# Patient Record
Sex: FEMALE | Race: WHITE | Employment: UNEMPLOYED | URBAN - METROPOLITAN AREA
[De-identification: names, ages, dates, MRNs, and addresses within clinical notes are randomized per-mention and may not be internally consistent; named-entity substitution may affect disease eponyms.]

---

## 2019-02-23 ENCOUNTER — APPOINTMENT (OUTPATIENT)
Dept: GENERAL RADIOLOGY | Age: 79
End: 2019-02-23
Payer: MEDICARE

## 2019-02-23 ENCOUNTER — HOSPITAL ENCOUNTER (EMERGENCY)
Age: 79
Discharge: HOME | End: 2019-02-23
Attending: EMERGENCY MEDICINE
Payer: MEDICARE

## 2019-02-23 VITALS
WEIGHT: 130 LBS | HEIGHT: 61 IN | HEART RATE: 79 BPM | BODY MASS INDEX: 24.55 KG/M2 | OXYGEN SATURATION: 96 % | TEMPERATURE: 98.1 F | RESPIRATION RATE: 18 BRPM | DIASTOLIC BLOOD PRESSURE: 67 MMHG | SYSTOLIC BLOOD PRESSURE: 131 MMHG

## 2019-02-23 DIAGNOSIS — S00.83XA CONTUSION OF FACE, INITIAL ENCOUNTER: Primary | ICD-10-CM

## 2019-02-23 DIAGNOSIS — S40.012A CONTUSION OF LEFT SHOULDER, INITIAL ENCOUNTER: ICD-10-CM

## 2019-02-23 PROCEDURE — 99283 EMERGENCY DEPT VISIT LOW MDM: CPT

## 2019-02-23 PROCEDURE — 73030 X-RAY EXAM OF SHOULDER: CPT

## 2019-02-23 RX ORDER — LISINOPRIL 40 MG/1
40 TABLET ORAL DAILY
COMMUNITY

## 2019-02-23 RX ORDER — INSULIN GLARGINE 100 [IU]/ML
50 INJECTION, SOLUTION SUBCUTANEOUS NIGHTLY
COMMUNITY

## 2019-02-23 RX ORDER — BUPRENORPHINE 15 UG/H
1 PATCH TRANSDERMAL WEEKLY
COMMUNITY

## 2019-02-23 RX ORDER — GABAPENTIN 300 MG/1
300 CAPSULE ORAL 2 TIMES DAILY
COMMUNITY

## 2019-02-23 RX ORDER — TRAMADOL HYDROCHLORIDE 50 MG/1
50 TABLET ORAL EVERY 6 HOURS PRN
COMMUNITY

## 2019-02-23 ASSESSMENT — PAIN DESCRIPTION - FREQUENCY: FREQUENCY: CONTINUOUS

## 2019-02-23 ASSESSMENT — ENCOUNTER SYMPTOMS
ABDOMINAL PAIN: 0
COLOR CHANGE: 0
EYE DISCHARGE: 0
COUGH: 0
FACIAL SWELLING: 0
DIARRHEA: 0
SHORTNESS OF BREATH: 0
CONSTIPATION: 0
VOMITING: 0
EYE REDNESS: 0

## 2019-02-23 ASSESSMENT — PAIN DESCRIPTION - DESCRIPTORS: DESCRIPTORS: ACHING;CONSTANT;SHARP

## 2019-02-23 ASSESSMENT — PAIN DESCRIPTION - ORIENTATION: ORIENTATION: LEFT

## 2019-02-23 ASSESSMENT — PAIN DESCRIPTION - LOCATION: LOCATION: SHOULDER;HEAD

## 2019-02-23 ASSESSMENT — PAIN SCALES - GENERAL: PAINLEVEL_OUTOF10: 7

## 2023-05-03 ENCOUNTER — HOSPITAL ENCOUNTER (OUTPATIENT)
Facility: CLINIC | Age: 83
Setting detail: SPECIMEN
Discharge: HOME | End: 2023-05-03
Payer: MEDICARE

## 2023-05-03 LAB
ABSOLUTE EOS #: 0.13 K/UL (ref 0–0.44)
ABSOLUTE IMMATURE GRANULOCYTE: <0.03 K/UL (ref 0–0.3)
ABSOLUTE LYMPH #: 0.95 K/UL (ref 1.1–3.7)
ABSOLUTE MONO #: 0.44 K/UL (ref 0.1–1.2)
BASOPHILS # BLD: 1 % (ref 0–2)
BASOPHILS ABSOLUTE: 0.05 K/UL (ref 0–0.2)
EOSINOPHILS RELATIVE PERCENT: 2 % (ref 1–4)
HCT VFR BLD AUTO: 36.7 % (ref 36.3–47.1)
HGB BLD-MCNC: 11.1 G/DL (ref 11.9–15.1)
IMMATURE GRANULOCYTES: 0 %
LYMPHOCYTES # BLD: 15 % (ref 24–43)
MCH RBC QN AUTO: 24.6 PG (ref 25.2–33.5)
MCHC RBC AUTO-ENTMCNC: 30.2 G/DL (ref 28.4–34.8)
MCV RBC AUTO: 81.4 FL (ref 82.6–102.9)
MONOCYTES # BLD: 7 % (ref 3–12)
NRBC AUTOMATED: 0 PER 100 WBC
PDW BLD-RTO: 19.1 % (ref 11.8–14.4)
PLATELET # BLD AUTO: 244 K/UL (ref 138–453)
PMV BLD AUTO: 11.2 FL (ref 8.1–13.5)
RBC # BLD: 4.51 M/UL (ref 3.95–5.11)
RBC # BLD: ABNORMAL 10*6/UL
SEG NEUTROPHILS: 75 % (ref 36–65)
SEGMENTED NEUTROPHILS ABSOLUTE COUNT: 4.6 K/UL (ref 1.5–8.1)
WBC # BLD AUTO: 6.2 K/UL (ref 3.5–11.3)

## 2023-05-03 PROCEDURE — 84443 ASSAY THYROID STIM HORMONE: CPT

## 2023-05-03 PROCEDURE — 84480 ASSAY TRIIODOTHYRONINE (T3): CPT

## 2023-05-03 PROCEDURE — 85025 COMPLETE CBC W/AUTO DIFF WBC: CPT

## 2023-05-03 PROCEDURE — 84439 ASSAY OF FREE THYROXINE: CPT

## 2023-05-04 LAB
T3 SERPL-MCNC: 106 NG/DL (ref 60–181)
T4 FREE SERPL-MCNC: 1.1 NG/DL (ref 0.9–1.7)
TSH SERPL-ACNC: 1.02 UIU/ML (ref 0.3–5)

## 2024-06-04 ENCOUNTER — HOSPITAL ENCOUNTER (INPATIENT)
Age: 84
LOS: 3 days | Discharge: NURSING, REHAB OR LONG-TERM CARE | DRG: 563 | End: 2024-06-07
Attending: SPECIALIST | Admitting: FAMILY MEDICINE
Payer: MEDICARE

## 2024-06-04 ENCOUNTER — APPOINTMENT (OUTPATIENT)
Dept: CT IMAGING | Facility: CLINIC | Age: 84
DRG: 563 | End: 2024-06-04
Payer: MEDICARE

## 2024-06-04 ENCOUNTER — APPOINTMENT (OUTPATIENT)
Dept: GENERAL RADIOLOGY | Facility: CLINIC | Age: 84
DRG: 563 | End: 2024-06-04
Payer: MEDICARE

## 2024-06-04 DIAGNOSIS — R74.8 ELEVATED CREATINE KINASE LEVEL: ICD-10-CM

## 2024-06-04 DIAGNOSIS — D50.0 IRON DEFICIENCY ANEMIA DUE TO CHRONIC BLOOD LOSS: ICD-10-CM

## 2024-06-04 DIAGNOSIS — S82.142A CLOSED FRACTURE OF LEFT TIBIAL PLATEAU, INITIAL ENCOUNTER: Primary | ICD-10-CM

## 2024-06-04 DIAGNOSIS — T14.8XXA FRACTURE: ICD-10-CM

## 2024-06-04 DIAGNOSIS — D64.9 ANEMIA, UNSPECIFIED TYPE: ICD-10-CM

## 2024-06-04 PROBLEM — N18.9 CHRONIC KIDNEY DISEASE: Status: ACTIVE | Noted: 2022-11-20

## 2024-06-04 PROBLEM — I77.89 ACQUIRED ARTERIOVENOUS MALFORMATION (HCC): Status: ACTIVE | Noted: 2021-04-22

## 2024-06-04 PROBLEM — I42.8 NONISCHEMIC CARDIOMYOPATHY (HCC): Status: ACTIVE | Noted: 2021-01-19

## 2024-06-04 PROBLEM — G47.33 OBSTRUCTIVE SLEEP APNEA SYNDROME: Status: ACTIVE | Noted: 2020-05-12

## 2024-06-04 PROBLEM — F09 MILD COGNITIVE DISORDER: Status: ACTIVE | Noted: 2020-07-07

## 2024-06-04 PROBLEM — Z79.4 TYPE 2 DIABETES MELLITUS WITHOUT COMPLICATION, WITH LONG-TERM CURRENT USE OF INSULIN (HCC): Status: ACTIVE | Noted: 2021-01-19

## 2024-06-04 PROBLEM — I10 ESSENTIAL HYPERTENSION: Status: ACTIVE | Noted: 2021-01-19

## 2024-06-04 PROBLEM — E11.9 TYPE 2 DIABETES MELLITUS WITHOUT COMPLICATION, WITH LONG-TERM CURRENT USE OF INSULIN (HCC): Status: ACTIVE | Noted: 2021-01-19

## 2024-06-04 PROBLEM — I50.20 HEART FAILURE WITH REDUCED EJECTION FRACTION (HCC): Status: ACTIVE | Noted: 2021-01-19

## 2024-06-04 PROBLEM — Z98.890 HISTORY OF CEA (CAROTID ENDARTERECTOMY): Status: ACTIVE | Noted: 2021-01-19

## 2024-06-04 PROBLEM — Z95.810 IMPLANTABLE CARDIOVERTER-DEFIBRILLATOR (ICD) IN SITU: Status: ACTIVE | Noted: 2023-06-21

## 2024-06-04 PROBLEM — K21.9 GASTROESOPHAGEAL REFLUX DISEASE: Status: ACTIVE | Noted: 2022-11-20

## 2024-06-04 PROBLEM — E05.90 SUBCLINICAL HYPERTHYROIDISM: Status: ACTIVE | Noted: 2021-01-19

## 2024-06-04 PROBLEM — I25.10 ARTERIOSCLEROSIS OF CORONARY ARTERY: Status: ACTIVE | Noted: 2024-06-04

## 2024-06-04 PROBLEM — J44.9 COPD (CHRONIC OBSTRUCTIVE PULMONARY DISEASE) (HCC): Status: ACTIVE | Noted: 2021-01-19

## 2024-06-04 PROBLEM — G25.81 RLS (RESTLESS LEGS SYNDROME): Status: ACTIVE | Noted: 2021-01-19

## 2024-06-04 LAB
ANION GAP SERPL CALCULATED.3IONS-SCNC: 7 MMOL/L (ref 9–17)
BASOPHILS # BLD: 0.1 K/UL (ref 0–0.2)
BASOPHILS NFR BLD: 1 % (ref 0–2)
BUN SERPL-MCNC: 16 MG/DL (ref 8–23)
CALCIUM SERPL-MCNC: 8.7 MG/DL (ref 8.6–10.4)
CHLORIDE SERPL-SCNC: 110 MMOL/L (ref 98–107)
CO2 SERPL-SCNC: 25 MMOL/L (ref 20–31)
CREAT SERPL-MCNC: 0.7 MG/DL (ref 0.5–0.9)
EOSINOPHIL # BLD: 0.2 K/UL (ref 0–0.4)
EOSINOPHILS RELATIVE PERCENT: 2 % (ref 1–4)
ERYTHROCYTE [DISTWIDTH] IN BLOOD BY AUTOMATED COUNT: 16.1 % (ref 12.5–15.4)
GFR, ESTIMATED: 85 ML/MIN/1.73M2
GLUCOSE BLD-MCNC: 115 MG/DL (ref 65–105)
GLUCOSE BLD-MCNC: 175 MG/DL (ref 65–105)
GLUCOSE BLD-MCNC: 185 MG/DL (ref 65–105)
GLUCOSE BLD-MCNC: 83 MG/DL (ref 65–105)
GLUCOSE BLD-MCNC: 85 MG/DL (ref 65–105)
GLUCOSE SERPL-MCNC: 85 MG/DL (ref 70–99)
HCT VFR BLD AUTO: 28.9 % (ref 36–46)
HGB BLD-MCNC: 9.3 G/DL (ref 12–16)
LYMPHOCYTES NFR BLD: 1.3 K/UL (ref 1–4.8)
LYMPHOCYTES RELATIVE PERCENT: 16 % (ref 24–44)
MCH RBC QN AUTO: 26.3 PG (ref 26–34)
MCHC RBC AUTO-ENTMCNC: 32.3 G/DL (ref 31–37)
MCV RBC AUTO: 81.4 FL (ref 80–100)
MONOCYTES NFR BLD: 0.6 K/UL (ref 0.1–1.2)
MONOCYTES NFR BLD: 7 % (ref 2–11)
NEUTROPHILS NFR BLD: 74 % (ref 36–66)
NEUTS SEG NFR BLD: 5.7 K/UL (ref 1.8–7.7)
PLATELET # BLD AUTO: 177 K/UL (ref 140–450)
PMV BLD AUTO: 8.4 FL (ref 6–12)
POTASSIUM SERPL-SCNC: 3.7 MMOL/L (ref 3.7–5.3)
RBC # BLD AUTO: 3.55 M/UL (ref 4–5.2)
SARS-COV-2 RDRP RESP QL NAA+PROBE: NOT DETECTED
SODIUM SERPL-SCNC: 142 MMOL/L (ref 135–144)
SPECIMEN DESCRIPTION: NORMAL
WBC OTHER # BLD: 7.8 K/UL (ref 3.5–11)

## 2024-06-04 PROCEDURE — 99222 1ST HOSP IP/OBS MODERATE 55: CPT | Performed by: ORTHOPAEDIC SURGERY

## 2024-06-04 PROCEDURE — 82947 ASSAY GLUCOSE BLOOD QUANT: CPT

## 2024-06-04 PROCEDURE — 73700 CT LOWER EXTREMITY W/O DYE: CPT

## 2024-06-04 PROCEDURE — 80048 BASIC METABOLIC PNL TOTAL CA: CPT

## 2024-06-04 PROCEDURE — 6370000000 HC RX 637 (ALT 250 FOR IP): Performed by: EMERGENCY MEDICINE

## 2024-06-04 PROCEDURE — 99285 EMERGENCY DEPT VISIT HI MDM: CPT

## 2024-06-04 PROCEDURE — 6360000002 HC RX W HCPCS: Performed by: NURSE PRACTITIONER

## 2024-06-04 PROCEDURE — 6370000000 HC RX 637 (ALT 250 FOR IP): Performed by: SPECIALIST

## 2024-06-04 PROCEDURE — 94640 AIRWAY INHALATION TREATMENT: CPT

## 2024-06-04 PROCEDURE — 94761 N-INVAS EAR/PLS OXIMETRY MLT: CPT

## 2024-06-04 PROCEDURE — 99223 1ST HOSP IP/OBS HIGH 75: CPT | Performed by: NURSE PRACTITIONER

## 2024-06-04 PROCEDURE — 6370000000 HC RX 637 (ALT 250 FOR IP): Performed by: NURSE PRACTITIONER

## 2024-06-04 PROCEDURE — 85025 COMPLETE CBC W/AUTO DIFF WBC: CPT

## 2024-06-04 PROCEDURE — 87635 SARS-COV-2 COVID-19 AMP PRB: CPT

## 2024-06-04 PROCEDURE — 1200000000 HC SEMI PRIVATE

## 2024-06-04 PROCEDURE — 73562 X-RAY EXAM OF KNEE 3: CPT

## 2024-06-04 PROCEDURE — 96374 THER/PROPH/DIAG INJ IV PUSH: CPT

## 2024-06-04 PROCEDURE — 36415 COLL VENOUS BLD VENIPUNCTURE: CPT

## 2024-06-04 PROCEDURE — 2580000003 HC RX 258: Performed by: NURSE PRACTITIONER

## 2024-06-04 PROCEDURE — 6360000002 HC RX W HCPCS: Performed by: SPECIALIST

## 2024-06-04 RX ORDER — AMLODIPINE BESYLATE 5 MG/1
5 TABLET ORAL DAILY
Status: DISCONTINUED | OUTPATIENT
Start: 2024-06-04 | End: 2024-06-07 | Stop reason: HOSPADM

## 2024-06-04 RX ORDER — PREGABALIN 75 MG/1
75 CAPSULE ORAL 2 TIMES DAILY
Status: DISCONTINUED | OUTPATIENT
Start: 2024-06-04 | End: 2024-06-07 | Stop reason: HOSPADM

## 2024-06-04 RX ORDER — PANTOPRAZOLE SODIUM 40 MG/1
40 TABLET, DELAYED RELEASE ORAL
Status: DISCONTINUED | OUTPATIENT
Start: 2024-06-05 | End: 2024-06-07 | Stop reason: HOSPADM

## 2024-06-04 RX ORDER — SODIUM CHLORIDE 0.9 % (FLUSH) 0.9 %
10 SYRINGE (ML) INJECTION PRN
Status: DISCONTINUED | OUTPATIENT
Start: 2024-06-04 | End: 2024-06-07 | Stop reason: HOSPADM

## 2024-06-04 RX ORDER — FLUTICASONE FUROATE AND VILANTEROL 100; 25 UG/1; UG/1
1 POWDER RESPIRATORY (INHALATION) DAILY
Status: ON HOLD | COMMUNITY
End: 2024-06-04

## 2024-06-04 RX ORDER — ENOXAPARIN SODIUM 100 MG/ML
40 INJECTION SUBCUTANEOUS DAILY
Status: DISCONTINUED | OUTPATIENT
Start: 2024-06-04 | End: 2024-06-05

## 2024-06-04 RX ORDER — METHIMAZOLE 5 MG/1
5 TABLET ORAL EVERY MORNING
COMMUNITY

## 2024-06-04 RX ORDER — SERTRALINE HYDROCHLORIDE 25 MG/1
50 TABLET, FILM COATED ORAL DAILY
Status: ON HOLD | COMMUNITY
End: 2024-06-04

## 2024-06-04 RX ORDER — GLUCAGON 1 MG/ML
1 KIT INJECTION PRN
Status: DISCONTINUED | OUTPATIENT
Start: 2024-06-04 | End: 2024-06-07 | Stop reason: HOSPADM

## 2024-06-04 RX ORDER — BUDESONIDE AND FORMOTEROL FUMARATE DIHYDRATE 160; 4.5 UG/1; UG/1
2 AEROSOL RESPIRATORY (INHALATION)
Status: DISCONTINUED | OUTPATIENT
Start: 2024-06-04 | End: 2024-06-07 | Stop reason: HOSPADM

## 2024-06-04 RX ORDER — FLUTICASONE PROPIONATE 50 MCG
1 SPRAY, SUSPENSION (ML) NASAL DAILY PRN
Status: DISCONTINUED | OUTPATIENT
Start: 2024-06-04 | End: 2024-06-07 | Stop reason: HOSPADM

## 2024-06-04 RX ORDER — ROPINIROLE 0.25 MG/1
0.25 TABLET, FILM COATED ORAL 3 TIMES DAILY
Status: ON HOLD | COMMUNITY
End: 2024-06-04

## 2024-06-04 RX ORDER — OXYCODONE HYDROCHLORIDE AND ACETAMINOPHEN 5; 325 MG/1; MG/1
1 TABLET ORAL EVERY 6 HOURS PRN
Status: DISCONTINUED | OUTPATIENT
Start: 2024-06-04 | End: 2024-06-07 | Stop reason: HOSPADM

## 2024-06-04 RX ORDER — VITAMIN B COMPLEX
1000 TABLET ORAL EVERY MORNING
Status: DISCONTINUED | OUTPATIENT
Start: 2024-06-04 | End: 2024-06-07 | Stop reason: HOSPADM

## 2024-06-04 RX ORDER — ROSUVASTATIN CALCIUM 10 MG/1
10 TABLET, COATED ORAL DAILY
Status: DISCONTINUED | OUTPATIENT
Start: 2024-06-04 | End: 2024-06-04

## 2024-06-04 RX ORDER — ROSUVASTATIN CALCIUM 5 MG/1
10 TABLET, COATED ORAL DAILY
Status: ON HOLD | COMMUNITY
Start: 2021-09-12 | End: 2024-06-04

## 2024-06-04 RX ORDER — IPRATROPIUM BROMIDE AND ALBUTEROL SULFATE 2.5; .5 MG/3ML; MG/3ML
1 SOLUTION RESPIRATORY (INHALATION) EVERY 6 HOURS PRN
Status: DISCONTINUED | OUTPATIENT
Start: 2024-06-04 | End: 2024-06-05

## 2024-06-04 RX ORDER — FLUTICASONE PROPIONATE 50 MCG
1 SPRAY, SUSPENSION (ML) NASAL DAILY PRN
COMMUNITY

## 2024-06-04 RX ORDER — FLUTICASONE FUROATE, UMECLIDINIUM BROMIDE AND VILANTEROL TRIFENATATE 200; 62.5; 25 UG/1; UG/1; UG/1
1 POWDER RESPIRATORY (INHALATION) DAILY
COMMUNITY

## 2024-06-04 RX ORDER — ALBUTEROL SULFATE 90 UG/1
2 AEROSOL, METERED RESPIRATORY (INHALATION) EVERY 6 HOURS PRN
Status: DISCONTINUED | OUTPATIENT
Start: 2024-06-04 | End: 2024-06-07 | Stop reason: HOSPADM

## 2024-06-04 RX ORDER — ALENDRONATE SODIUM 70 MG/1
70 TABLET ORAL
COMMUNITY

## 2024-06-04 RX ORDER — METHIMAZOLE 5 MG/1
5 TABLET ORAL DAILY
Status: DISCONTINUED | OUTPATIENT
Start: 2024-06-04 | End: 2024-06-07 | Stop reason: HOSPADM

## 2024-06-04 RX ORDER — SODIUM CHLORIDE 9 MG/ML
INJECTION, SOLUTION INTRAVENOUS PRN
Status: DISCONTINUED | OUTPATIENT
Start: 2024-06-04 | End: 2024-06-07 | Stop reason: HOSPADM

## 2024-06-04 RX ORDER — MULTIVIT-MIN/IRON/FOLIC ACID/K 18-600-40
1 CAPSULE ORAL EVERY MORNING
COMMUNITY

## 2024-06-04 RX ORDER — MORPHINE SULFATE 2 MG/ML
2 INJECTION, SOLUTION INTRAMUSCULAR; INTRAVENOUS ONCE
Status: COMPLETED | OUTPATIENT
Start: 2024-06-04 | End: 2024-06-04

## 2024-06-04 RX ORDER — ALBUTEROL SULFATE 90 UG/1
2 AEROSOL, METERED RESPIRATORY (INHALATION) EVERY 4 HOURS PRN
COMMUNITY

## 2024-06-04 RX ORDER — BUDESONIDE AND FORMOTEROL FUMARATE DIHYDRATE 80; 4.5 UG/1; UG/1
2 AEROSOL RESPIRATORY (INHALATION)
Status: DISCONTINUED | OUTPATIENT
Start: 2024-06-04 | End: 2024-06-04

## 2024-06-04 RX ORDER — OXYCODONE HYDROCHLORIDE AND ACETAMINOPHEN 5; 325 MG/1; MG/1
2 TABLET ORAL EVERY 6 HOURS PRN
Status: DISCONTINUED | OUTPATIENT
Start: 2024-06-04 | End: 2024-06-07 | Stop reason: HOSPADM

## 2024-06-04 RX ORDER — IPRATROPIUM BROMIDE AND ALBUTEROL SULFATE 2.5; .5 MG/3ML; MG/3ML
1 SOLUTION RESPIRATORY (INHALATION) EVERY 6 HOURS PRN
COMMUNITY

## 2024-06-04 RX ORDER — SODIUM CHLORIDE 0.9 % (FLUSH) 0.9 %
5-40 SYRINGE (ML) INJECTION EVERY 12 HOURS SCHEDULED
Status: DISCONTINUED | OUTPATIENT
Start: 2024-06-04 | End: 2024-06-07 | Stop reason: HOSPADM

## 2024-06-04 RX ORDER — LORATADINE 10 MG/1
10 TABLET ORAL EVERY MORNING
COMMUNITY

## 2024-06-04 RX ORDER — EMPAGLIFLOZIN 10 MG/1
10 TABLET, FILM COATED ORAL DAILY
Status: ON HOLD | COMMUNITY
End: 2024-06-07 | Stop reason: HOSPADM

## 2024-06-04 RX ORDER — MORPHINE SULFATE 2 MG/ML
2 INJECTION, SOLUTION INTRAMUSCULAR; INTRAVENOUS EVERY 4 HOURS PRN
Status: DISCONTINUED | OUTPATIENT
Start: 2024-06-04 | End: 2024-06-07 | Stop reason: HOSPADM

## 2024-06-04 RX ORDER — INSULIN LISPRO 100 [IU]/ML
0-4 INJECTION, SOLUTION INTRAVENOUS; SUBCUTANEOUS NIGHTLY
Status: DISCONTINUED | OUTPATIENT
Start: 2024-06-04 | End: 2024-06-07 | Stop reason: HOSPADM

## 2024-06-04 RX ORDER — MAGNESIUM SULFATE 1 G/100ML
1000 INJECTION INTRAVENOUS PRN
Status: DISCONTINUED | OUTPATIENT
Start: 2024-06-04 | End: 2024-06-07 | Stop reason: HOSPADM

## 2024-06-04 RX ORDER — PREGABALIN 75 MG/1
75 CAPSULE ORAL 2 TIMES DAILY
COMMUNITY

## 2024-06-04 RX ORDER — INSULIN LISPRO 100 [IU]/ML
INJECTION, SOLUTION INTRAVENOUS; SUBCUTANEOUS
COMMUNITY

## 2024-06-04 RX ORDER — DEXTROSE MONOHYDRATE 100 MG/ML
INJECTION, SOLUTION INTRAVENOUS CONTINUOUS PRN
Status: DISCONTINUED | OUTPATIENT
Start: 2024-06-04 | End: 2024-06-07 | Stop reason: HOSPADM

## 2024-06-04 RX ORDER — ACETAMINOPHEN 650 MG/1
650 SUPPOSITORY RECTAL EVERY 6 HOURS PRN
Status: DISCONTINUED | OUTPATIENT
Start: 2024-06-04 | End: 2024-06-07 | Stop reason: HOSPADM

## 2024-06-04 RX ORDER — PANTOPRAZOLE SODIUM 40 MG/1
40 TABLET, DELAYED RELEASE ORAL
Status: DISCONTINUED | OUTPATIENT
Start: 2024-06-04 | End: 2024-06-04

## 2024-06-04 RX ORDER — LISINOPRIL 40 MG/1
40 TABLET ORAL DAILY
Status: DISCONTINUED | OUTPATIENT
Start: 2024-06-04 | End: 2024-06-07 | Stop reason: HOSPADM

## 2024-06-04 RX ORDER — OXYCODONE HYDROCHLORIDE AND ACETAMINOPHEN 5; 325 MG/1; MG/1
1 TABLET ORAL ONCE
Status: COMPLETED | OUTPATIENT
Start: 2024-06-04 | End: 2024-06-04

## 2024-06-04 RX ORDER — FERROUS SULFATE 324(65)MG
324 TABLET, DELAYED RELEASE (ENTERIC COATED) ORAL DAILY
COMMUNITY

## 2024-06-04 RX ORDER — INSULIN LISPRO 100 [IU]/ML
0-4 INJECTION, SOLUTION INTRAVENOUS; SUBCUTANEOUS
Status: DISCONTINUED | OUTPATIENT
Start: 2024-06-04 | End: 2024-06-07 | Stop reason: HOSPADM

## 2024-06-04 RX ORDER — INSULIN GLARGINE 100 [IU]/ML
35 INJECTION, SOLUTION SUBCUTANEOUS NIGHTLY
COMMUNITY

## 2024-06-04 RX ORDER — POLYETHYLENE GLYCOL 3350 17 G/17G
17 POWDER, FOR SOLUTION ORAL DAILY PRN
Status: DISCONTINUED | OUTPATIENT
Start: 2024-06-04 | End: 2024-06-07 | Stop reason: SDUPTHER

## 2024-06-04 RX ORDER — ACETAMINOPHEN 325 MG/1
650 TABLET ORAL EVERY 6 HOURS PRN
Status: DISCONTINUED | OUTPATIENT
Start: 2024-06-04 | End: 2024-06-07 | Stop reason: HOSPADM

## 2024-06-04 RX ORDER — ONDANSETRON 4 MG/1
4 TABLET, ORALLY DISINTEGRATING ORAL EVERY 8 HOURS PRN
Status: DISCONTINUED | OUTPATIENT
Start: 2024-06-04 | End: 2024-06-07 | Stop reason: HOSPADM

## 2024-06-04 RX ORDER — LIDOCAINE 50 MG/G
1 PATCH TOPICAL DAILY
COMMUNITY

## 2024-06-04 RX ORDER — ONDANSETRON 2 MG/ML
4 INJECTION INTRAMUSCULAR; INTRAVENOUS EVERY 6 HOURS PRN
Status: DISCONTINUED | OUTPATIENT
Start: 2024-06-04 | End: 2024-06-07 | Stop reason: HOSPADM

## 2024-06-04 RX ORDER — UREA 10 %
324 LOTION (ML) TOPICAL DAILY
Status: DISCONTINUED | OUTPATIENT
Start: 2024-06-04 | End: 2024-06-05

## 2024-06-04 RX ORDER — KETOCONAZOLE 20 MG/ML
SHAMPOO TOPICAL
COMMUNITY

## 2024-06-04 RX ORDER — PANTOPRAZOLE SODIUM 40 MG/1
40 TABLET, DELAYED RELEASE ORAL EVERY MORNING
COMMUNITY

## 2024-06-04 RX ORDER — TRAMADOL HYDROCHLORIDE 50 MG/1
50 TABLET ORAL ONCE
Status: COMPLETED | OUTPATIENT
Start: 2024-06-04 | End: 2024-06-04

## 2024-06-04 RX ORDER — POTASSIUM CHLORIDE 20 MEQ/1
40 TABLET, EXTENDED RELEASE ORAL PRN
Status: DISCONTINUED | OUTPATIENT
Start: 2024-06-04 | End: 2024-06-07 | Stop reason: HOSPADM

## 2024-06-04 RX ORDER — POTASSIUM CHLORIDE 7.45 MG/ML
10 INJECTION INTRAVENOUS PRN
Status: DISCONTINUED | OUTPATIENT
Start: 2024-06-04 | End: 2024-06-07 | Stop reason: HOSPADM

## 2024-06-04 RX ORDER — INSULIN GLARGINE 100 [IU]/ML
25 INJECTION, SOLUTION SUBCUTANEOUS NIGHTLY
Status: DISCONTINUED | OUTPATIENT
Start: 2024-06-04 | End: 2024-06-06

## 2024-06-04 RX ORDER — ROPINIROLE 1 MG/1
1 TABLET, FILM COATED ORAL 2 TIMES DAILY
COMMUNITY

## 2024-06-04 RX ADMIN — ONDANSETRON 4 MG: 2 INJECTION INTRAMUSCULAR; INTRAVENOUS at 22:07

## 2024-06-04 RX ADMIN — AMLODIPINE BESYLATE 5 MG: 5 TABLET ORAL at 13:38

## 2024-06-04 RX ADMIN — LISINOPRIL 40 MG: 40 TABLET ORAL at 13:38

## 2024-06-04 RX ADMIN — OXYCODONE HYDROCHLORIDE AND ACETAMINOPHEN 1 TABLET: 5; 325 TABLET ORAL at 09:20

## 2024-06-04 RX ADMIN — FERROUS SULFATE TAB EC 325 MG (65 MG FE EQUIVALENT) 324 MG: 325 (65 FE) TABLET DELAYED RESPONSE at 14:23

## 2024-06-04 RX ADMIN — TRAMADOL HYDROCHLORIDE 50 MG: 50 TABLET, COATED ORAL at 00:36

## 2024-06-04 RX ADMIN — PREGABALIN 75 MG: 75 CAPSULE ORAL at 21:07

## 2024-06-04 RX ADMIN — BUDESONIDE AND FORMOTEROL FUMARATE DIHYDRATE 2 PUFF: 160; 4.5 AEROSOL RESPIRATORY (INHALATION) at 21:18

## 2024-06-04 RX ADMIN — SODIUM CHLORIDE, PRESERVATIVE FREE 10 ML: 5 INJECTION INTRAVENOUS at 21:09

## 2024-06-04 RX ADMIN — INSULIN GLARGINE 25 UNITS: 100 INJECTION, SOLUTION SUBCUTANEOUS at 21:06

## 2024-06-04 RX ADMIN — PANTOPRAZOLE SODIUM 40 MG: 40 TABLET, DELAYED RELEASE ORAL at 17:07

## 2024-06-04 RX ADMIN — OXYCODONE HYDROCHLORIDE AND ACETAMINOPHEN 2 TABLET: 5; 325 TABLET ORAL at 21:07

## 2024-06-04 RX ADMIN — PREGABALIN 75 MG: 75 CAPSULE ORAL at 13:37

## 2024-06-04 RX ADMIN — SODIUM CHLORIDE, PRESERVATIVE FREE 10 ML: 5 INJECTION INTRAVENOUS at 22:07

## 2024-06-04 RX ADMIN — SODIUM CHLORIDE, PRESERVATIVE FREE 10 ML: 5 INJECTION INTRAVENOUS at 17:41

## 2024-06-04 RX ADMIN — Medication 1000 UNITS: at 13:37

## 2024-06-04 RX ADMIN — ENOXAPARIN SODIUM 40 MG: 100 INJECTION SUBCUTANEOUS at 13:51

## 2024-06-04 RX ADMIN — OXYCODONE HYDROCHLORIDE AND ACETAMINOPHEN 2 TABLET: 5; 325 TABLET ORAL at 13:41

## 2024-06-04 RX ADMIN — MORPHINE SULFATE 2 MG: 2 INJECTION, SOLUTION INTRAMUSCULAR; INTRAVENOUS at 04:21

## 2024-06-04 ASSESSMENT — PAIN - FUNCTIONAL ASSESSMENT
PAIN_FUNCTIONAL_ASSESSMENT: 0-10
PAIN_FUNCTIONAL_ASSESSMENT: 0-10
PAIN_FUNCTIONAL_ASSESSMENT: PREVENTS OR INTERFERES SOME ACTIVE ACTIVITIES AND ADLS

## 2024-06-04 ASSESSMENT — ENCOUNTER SYMPTOMS
SHORTNESS OF BREATH: 1
SORE THROAT: 0
COUGH: 0
ABDOMINAL PAIN: 0
NAUSEA: 0
SHORTNESS OF BREATH: 0

## 2024-06-04 ASSESSMENT — PAIN SCALES - GENERAL
PAINLEVEL_OUTOF10: 8
PAINLEVEL_OUTOF10: 10
PAINLEVEL_OUTOF10: 4
PAINLEVEL_OUTOF10: 6
PAINLEVEL_OUTOF10: 8
PAINLEVEL_OUTOF10: 4
PAINLEVEL_OUTOF10: 8
PAINLEVEL_OUTOF10: 7
PAINLEVEL_OUTOF10: 4
PAINLEVEL_OUTOF10: 4

## 2024-06-04 ASSESSMENT — PAIN DESCRIPTION - ORIENTATION
ORIENTATION: LEFT
ORIENTATION: MID
ORIENTATION: LEFT
ORIENTATION: LEFT

## 2024-06-04 ASSESSMENT — PAIN DESCRIPTION - PAIN TYPE: TYPE: ACUTE PAIN

## 2024-06-04 ASSESSMENT — PAIN DESCRIPTION - LOCATION
LOCATION: LEG
LOCATION: KNEE
LOCATION: KNEE
LOCATION: KNEE;LEG

## 2024-06-04 ASSESSMENT — PAIN DESCRIPTION - FREQUENCY: FREQUENCY: CONTINUOUS

## 2024-06-04 ASSESSMENT — PAIN DESCRIPTION - DESCRIPTORS
DESCRIPTORS: ACHING
DESCRIPTORS: SHOOTING
DESCRIPTORS: ACHING
DESCRIPTORS: ACHING;SHARP

## 2024-06-04 NOTE — CARE COORDINATION
Case Management Assessment  Initial Evaluation    Date/Time of Evaluation: 6/4/2024 3:44 PM  Assessment Completed by: TORSTEN LYNN RN    If patient is discharged prior to next notation, then this note serves as note for discharge by case management.    Patient Name: Sumaya Castillo                   YOB: 1940  Diagnosis: Closed fracture of left tibial plateau, initial encounter [S82.142A]  Tibial plateau fracture, left, closed, initial encounter [S82.142A]  Fracture [T14.8XXA]                   Date / Time: 6/4/2024 12:14 AM    Patient Admission Status: Inpatient   Readmission Risk (Low < 19, Mod (19-27), High > 27): Readmission Risk Score: 10.9    Current PCP: Marlen García MD  PCP verified by CM? Yes    Chart Reviewed: Yes      History Provided by: Patient  Patient Orientation: Alert and Oriented, Person, Place, Situation, Self    Patient Cognition: Alert    Hospitalization in the last 30 days (Readmission):  No    If yes, Readmission Assessment in  Navigator will be completed.    Advance Directives:      Code Status: Full Code   Patient's Primary Decision Maker is: Legal Next of Kin      Discharge Planning:    Patient lives with: Alone Type of Home: Trailer/Mobile Home  Primary Care Giver:    Patient Support Systems include: Children   Current Financial resources: None  Current community resources: None  Current services prior to admission: Durable Medical Equipment            Current DME: Cane, Wheelchair            Type of Home Care services:  OT, PT, Skilled Therapy    ADLS  Prior functional level: Independent in ADLs/IADLs  Current functional level: Assistance with the following:, Bathing, Toileting, Cooking, Housework, Shopping, Mobility    PT AM-PAC:   /24  OT AM-PAC:   /24    Family can provide assistance at DC: No  Would you like Case Management to discuss the discharge plan with any other family members/significant others, and if so, who? Yes (daughter)  Plans to Return to  with a choice of provider and agrees with the discharge plan. Freedom of choice list with basic dialogue that supports the patient's individualized plan of care/goals and shares the quality data associated with the providers was provided to: Patient, Patient Representative   Patient Representative Name: daughter     The Patient and/or Patient Representative Agree with the Discharge Plan? Yes    TORSTEN LYNN RN  Case Management Department  Ph: [phone] Fax: [phone]

## 2024-06-04 NOTE — PROGRESS NOTES
Patient transferred from [facility] at this time. Patient resting in bed, call light in reach, side rails up x2, bed alarm on. Patient orders released and reviewed. Patient oriented to room.

## 2024-06-04 NOTE — ED PROVIDER NOTES
ADDENDUM   Care of this patient was assumed from Dr Coulter.  The patient was seen for Fall and Knee Pain  .  The patient's initial evaluation and plan have been discussed with the prior provider who initially evaluated the patient.  Nursing Notes, Past Medical Hx, Past Surgical Hx, Social Hx, Allergies, and Family Hx were all reviewed.    DIAGNOSTIC RESULTS     EKG: All EKG's are interpreted by the Emergency Department Physician who either signs or Co-signs this chart in the absence of a cardiologist.  Please see dictation of EKG in ED Course or in previous physician note.    RADIOLOGY:  The following imaging tests were ordered, reviewed by me, and interpreted by Radiology    CT KNEE LEFT WO CONTRAST    Result Date: 6/4/2024  1. Acute, mildly comminuted, nondisplaced intra-articular fracture of the proximal tibia, predominately involving the lateral tibial plateau, with fracture planes extending to the tibial spines, and to the posterior proximal diaphysis. 2. Moderate lipohemarthrosis. 3. Moderate to severe tricompartmental degenerative changes, with complete joint space loss of the patellofemoral compartment laterally. 4. Chondrocalcinosis.     XR KNEE LEFT (3 VIEWS)    Result Date: 6/4/2024  1. Suspected nondisplaced proximal tibial fracture with lucency extending to the metadiaphysis, only appreciated on a single view.  Dedicated CT is recommended for confirmation. 2. Small knee joint effusion. 3. Moderate tricompartmental knee osteoarthritis.       Interpretation per the Radiologist below, if available at the time of this note:  CT KNEE LEFT WO CONTRAST   Preliminary Result   1. Acute, mildly comminuted, nondisplaced intra-articular fracture of the   proximal tibia, predominately involving the lateral tibial plateau, with   fracture planes extending to the tibial spines, and to the posterior proximal   diaphysis.   2. Moderate lipohemarthrosis.   3. Moderate to severe tricompartmental degenerative changes,  initial encounter        CONDITION ON DISPOSITION:   Cristobal Funk DO  06/04/24 0906

## 2024-06-04 NOTE — RT PROTOCOL NOTE
RT Inhaler-Nebulizer Bronchodilator Protocol Note    There is a bronchodilator order in the chart from a provider indicating to follow the RT Bronchodilator Protocol and there is an “Initiate RT Inhaler-Nebulizer Bronchodilator Protocol” order as well (see protocol at bottom of note).    CXR Findings:  No results found.    The findings from the last RT Protocol Assessment were as follows:   History Pulmonary Disease: Chronic pulmonary disease  Respiratory Pattern: Regular pattern and RR 12-20 bpm  Breath Sounds: Clear breath sounds  Cough: Strong, spontaneous, non-productive  Indication for Bronchodilator Therapy:    Bronchodilator Assessment Score: 2    Aerosolized bronchodilator medication orders have been revised according to the RT Inhaler-Nebulizer Bronchodilator Protocol below.    Respiratory Therapist to perform RT Therapy Protocol Assessment initially then follow the protocol.  Repeat RT Therapy Protocol Assessment PRN for score 0-3 or on second treatment, BID, and PRN for scores above 3.    No Indications - adjust the frequency to every 6 hours PRN wheezing or bronchospasm, if no treatments needed after 48 hours then discontinue using Per Protocol order mode.     If indication present, adjust the RT bronchodilator orders based on the Bronchodilator Assessment Score as indicated below.  Use Inhaler orders unless patient has one or more of the following: on home nebulizer, not able to hold breath for 10 seconds, is not alert and oriented, cannot activate and use MDI correctly, or respiratory rate 25 breaths per minute or more, then use the equivalent nebulizer order(s) with same Frequency and PRN reasons based on the score.  If a patient is on this medication at home then do not decrease Frequency below that used at home.    0-3 - enter or revise RT bronchodilator order(s) to equivalent RT Bronchodilator order with Frequency of every 4 hours PRN for wheezing or increased work of breathing using Per Protocol  order mode.        4-6 - enter or revise RT Bronchodilator order(s) to two equivalent RT bronchodilator orders with one order with BID Frequency and one order with Frequency of every 4 hours PRN wheezing or increased work of breathing using Per Protocol order mode.        7-10 - enter or revise RT Bronchodilator order(s) to two equivalent RT bronchodilator orders with one order with TID Frequency and one order with Frequency of every 4 hours PRN wheezing or increased work of breathing using Per Protocol order mode.       11-13 - enter or revise RT Bronchodilator order(s) to one equivalent RT bronchodilator order with QID Frequency and an Albuterol order with Frequency of every 4 hours PRN wheezing or increased work of breathing using Per Protocol order mode.      Greater than 13 - enter or revise RT Bronchodilator order(s) to one equivalent RT bronchodilator order with every 4 hours Frequency and an Albuterol order with Frequency of every 2 hours PRN wheezing or increased work of breathing using Per Protocol order mode.     RT to enter RT Home Evaluation for COPD & MDI Assessment order using Per Protocol order mode.    Electronically signed by tucker tijerina RCP on 6/4/2024 at 2:58 PM

## 2024-06-04 NOTE — PROGRESS NOTES
Physical Therapy  DATE: 2024    NAME: Sumaya Castillo  MRN: [id number]   : 1940    Patient not seen this date for Physical Therapy due to:      [x]   cx, Imaging revealed acute LEFT proximal tibial fracture, still pending ortho consult     [] Critical Lab Value Level     [] Blood transfusion in progress    [] Acute or unstable cardiovascular status   _MAP < 55 or more than >115  _HR < 40 or > 130    [] Acute or unstable pulmonary status   -FiO2 > 60%   _RR < 5 or >40    _O2 sats < 85%    [] Strict Bedrest    [] Off Unit for surgery or procedure    [] Off Unit for testing       [] Pending imaging to R/O fracture    [] Refusal by Patient      [] Other      [] PT being discontinued at this time. Patient independent. No further needs.     [] PT being discontinued at this time as the patient has been transferred to hospice care. No further needs.      ALICIA CONTEH, PT

## 2024-06-04 NOTE — PROGRESS NOTES
Transitions of Care Pharmacy Service   Medication Review    The patient's list of current home medications has been reviewed.     Source(s) of information: patient, PCP medication list, Surescripts     Based on information provided by the above source(s), I have updated the patient's home med list as described below.       I changed or updated the following medications on the patient's home medication list:  Discontinued metFORMIN ER (GLUCOPHAGE-XR) 500 MG XR tablet  HYDROcodone-acetaminophen (NORCO) 5-325 MG tablet  Insulin Aspart (NOVOLOG SC)  Montelukast Sodium (SINGULAIR PO)  buprenorphine (BUPRENEX) 15 MCG/HR PTWK  traMADol (ULTRAM) 50 MG tablet  fluticasone furoate-vilanterol (BREO ELLIPTA) 100-25mcg   sertraline (ZOLOFT) 25 MG tablet  rosuvastatin (CRESTOR) 5 MG tablet  gabapentin (NEURONTIN) 300 MG capsule     Added alendronate (FOSAMAX) 70 MG tablet  TRELEGY ELLIPTA 200-62.5-25 MCG/ACT inhaler  GUAIFENESIN 1200mg  lidocaine (LIDODERM) 5 % patch  ketoconazole (NIZORAL) 2 % shampoo     Adjusted   pantoprazole 40 MG tablet - from BID to QD  JARDIANCE 10 MG tablet  methIMAzole (TAPAZOLE) 5 MG tablet  pregabalin - from 25mg to 75mg  Ropinirole - from 0.25mg to 1mg           Please feel free to call me with any questions about this encounter. Thank you.    This note will be reviewed and co-signed by the [facility] Pharmacist.   The pharmacist will review inpatient orders and contact the physician about any discrepancies.    Monae Ngo, pharmacy technician  Transitions of Care Pharmacy Service  Phone:  [phone]  Fax: [phone]      Electronically signed by Monae gNo on 6/4/2024 at 4:33 PM       Prior to Admission medications    Medication Sig   pantoprazole (PROTONIX) 40 MG tablet   Take 1 tablet by mouth daily   albuterol sulfate HFA (PROVENTIL;VENTOLIN;PROAIR) 108 (90 Base) MCG/ACT inhaler   Inhale 2 puffs into the lungs every 4 hours as needed for Wheezing or Shortness of Breath

## 2024-06-04 NOTE — H&P
[facility]  Office: [phone]  Pj Oden DO, Jesús Ocampo DO, Emerson Lucero DO, Dom Brian DO, Zuleyka Curry MD, Estella Fountain MD, Nicholas Mcarthur MD, Trudy Morelos MD,  Osman Boggs MD, Romain Ballesteros MD, Lamberto Patterson MD,  Anand Loco DO, Jennifer Dotson MD, Chava Phillips MD, David Oden DO, Jocelyn Lezama MD,  Blaise Philip DO, Uzma Miller MD, Josephine Fairchild MD, Jennifer Barr MD, Lazaro Goldstein MD,  Abhi Lua MD, Víctor Cohn MD, Shanell Seth MD, Ambreen Chester MD, Atilio Patino MD, Sanjay Rincon MD, Chris Clarke DO, Bob Valverde DO, Ashley Porras MD,  Jairon Blair MD, Shirley Waterhouse, CNP,  Loren Lobo CNP, Jam Fernandes, CNP,  Fiona Thompson, DNP, Radha Varghese, CNP, Sujatha Garza, CNP, Gilda Durham, CNP, Estephanie Santoyo, CNP, Astrid Jade, PA-C, Rani Handy PA-C, Stephanie Orona, CNP, Malorie Montoya, CNP, Laila Abdi, CNP, Carmen Ayala, CNP, Kayleen Khan, CNP, Génesis Velasquez, CNS, Jenna Trotter, CNP, Brandy Jay CNP, Tracy Schwab, CNP         [facility]   IN-PATIENT SERVICE   [facility]    HISTORY AND PHYSICAL EXAMINATION            Date:   6/4/2024  Patient name:  Sumaya Castillo  Date of admission:  6/4/2024 12:14 AM  MRN:   [id number]  Account:  [id number]  YOB: 1940  PCP:    Marlen García MD  Room:   2104/2104-01  Code Status:    Full Code    Chief Complaint:     Chief Complaint   Patient presents with    Fall    Knee Pain       History Obtained From:     patient, electronic medical record    History of Present Illness:     Sumaya Castillo is a 83 y.o.  /  female who presents with Fall and Knee Pain   and is admitted to the hospital for the management of Tibial plateau fracture, left, closed, initial encounter.    The patient presented to [place] ER related to left knee pain and swelling after fall.  Greater than notes patient was at the [facility] on Monday

## 2024-06-04 NOTE — ED PROVIDER NOTES
[facility]  [address] [address]  Phone: [phone]      Pt Name: Sumaya Castillo  MRN: [id number]  Birthdate 1940  Date of evaluation: 6/4/2024      CHIEF COMPLAINT       Chief Complaint   Patient presents with    Fall    Knee Pain         HISTORY OF PRESENT ILLNESS    Sumaya Castillo is a 83 y.o. female who presents   Chief Complaint   Patient presents with    Fall    Knee Pain   .    83-year-old female patient presents to the emergency department brought in via EMS for evaluation of left knee pain and swelling status post fall at [facility] place at about 3:30 PM yesterday afternoon.  Patient states that she was washing her car at the [facility] and lost her balance and fell hitting her left knee.  She was holding onto the vacuum cleaning which prevented further injuries.  She denies feeling lightheaded, dizzy and denies any tingling, numbness or weakness distally.  She denies any other injuries including denies any head injury or loss of consciousness.  She denies any headache, neck pain, chest pain, shortness of breath, palpitations or diaphoresis.  The pain and swelling have gradually increased during the evening hours and she called EMS to come to the emergency department for evaluation.  Patient states she took aspirin at around 7 PM without much relief and in fact the pain has been getting worse.  She denies any previous injuries or procedure or surgery involving the left knee.  Pain is worse with the movements, weightbearing and ambulation and better with rest.    REVIEW OF SYSTEMS     Review of Systems   Constitutional:  Negative for diaphoresis, fatigue and fever.   HENT:  Negative for congestion and sore throat.    Eyes:  Negative for visual disturbance.   Respiratory:  Negative for cough and shortness of breath.    Cardiovascular:  Negative for chest pain and palpitations.   Gastrointestinal:  Negative for abdominal pain and nausea.   Genitourinary:  Negative for dysuria and  No deformity or crepitus. Decreased range of motion. Tenderness present over the patellar tendon.   Skin:     General: Skin is warm and dry.   Neurological:      General: No focal deficit present.      Mental Status: She is alert and oriented to person, place, and time.   Psychiatric:         Behavior: Behavior normal.         Thought Content: Thought content normal.           DIFFERENTIAL DIAGNOSIS/ MDM:     Differential diagnosis considered: Fracture, contusion, sprain    Chronic Conditions affecting care (DM,HTN,CA, etc): see past medical history above.    Social Determinants of Health affecting care (unable to care for self, lives alone, unemployed, homeless,etc): Patient lives at home alone and is able to take care of herself    History source(s) (patient,spouse,parent,family,friend,EMS,etc): Patient and EMS    Review of external sources (ECF,Hospital records,EMS report, radiology reports, etc): Hospital records    Tests considered but not ordered: See below    Independent interpretation of tests (eg.  X-ray, CAT scan, Doppler studies, EKG): See below    Discussion of x-ray results with radiology: See below    Consults: See below    Consideration for admission/observation (even if discharged): Considered admission, final decision will be based on test results and patient's status.    Prescription considerations: See below    Sepsis considered: Considered but no evidence of bacteremia or sepsis at this time    Critical Care note written: See below    DIAGNOSTIC RESULTS     EKG: All EKG's are interpreted by the Emergency Department Physician who either signs or Co-signs this chart in the absence of a cardiologist.    None obtained    RADIOLOGY:   I reviewed the radiologist interpretations:  XR KNEE LEFT (3 VIEWS)   Preliminary Result   1. Suspected nondisplaced proximal tibial fracture with lucency extending to   the metadiaphysis, only appreciated on a single view.  Dedicated CT is   recommended for

## 2024-06-04 NOTE — CONSULTS
[facility] MED SURG  [address] [address]  Dept: [phone]  Loc: [phone]    Inpatient Orthopedic Consult      CHIEF COMPLAINT:    left knee pain    HISTORY OF PRESENT ILLNESS:      The patient is a 83 y.o. female who is being seen for evaluation of the above, which began 6/3/24 secondary to an injury (she reports a fall from standing height). The patient localizes the pain to the above location. Prior to this, the patient used a cane for ambulation.    History is obtained today from:   [x]  the patient     [x]  EMR     []  one family member/friend    []  multiple family members/friends    [x]  other:  Er doctor, bedside nurse         REVIEW OF SYSTEMS:  Constitutional: Negative for fever.   HENT: Negative for tinnitus.    Eyes: Negative for pain.   Respiratory: Negative for shortness of breath.    Cardiovascular: Negative for chest pain.   Gastrointestinal: Negative for abdominal pain.   Genitourinary: Negative for dysuria.   Skin: Negative for rash.   Neurological: Negative for headaches.   Hematological: Does not bruise/bleed easily.   Musculoskeletal: See HPI for pertinent positives     Past Medical History:    Past Medical History:   Diagnosis Date    Asthma     COPD (chronic obstructive pulmonary disease) (HCC)     Diabetes mellitus (HCC)     History of blood transfusion     Hypertension        Past Surgical History:    Past Surgical History:   Procedure Laterality Date    ABDOMEN SURGERY      CARDIAC DEFIBRILLATOR PLACEMENT      VIVA XTCRT-D  Defibrillator    CAROTID ENDARTERECTOMY         Current Medications:   Current Facility-Administered Medications   Medication Dose Route Frequency Provider Last Rate Last Admin    sodium chloride flush 0.9 % injection 5-40 mL  5-40 mL IntraVENous 2 times per day Gilda Durham APRN - CNP        sodium chloride flush 0.9 % injection 10 mL  10 mL IntraVENous PRN Gilda Durham APRN - CNP        0.9 % sodium chloride  images and reports reviewed. left tibial plateau fracture.        Relevant previous imaging reviewed, both imaging and report(s) as below:    CT KNEE LEFT WO CONTRAST  Result Date: 6/4/2024  1. Acute, mildly comminuted, nondisplaced intra-articular fracture of the proximal tibia, predominately involving the lateral tibial plateau, with fracture planes extending to the tibial spines, and to the posterior proximal diaphysis. 2. Moderate lipohemarthrosis. 3. Moderate to severe tricompartmental degenerative changes, with complete joint space loss of the patellofemoral compartment laterally. 4. Chondrocalcinosis.     XR KNEE LEFT (3 VIEWS)  Result Date: 6/4/2024  1. Suspected nondisplaced proximal tibial fracture with lucency extending to the metadiaphysis, only appreciated on a single view.  Dedicated CT is recommended for confirmation. 2. Small knee joint effusion. 3. Moderate tricompartmental knee osteoarthritis.          LABS:   Lab Results   Component Value Date    WBC 7.8 06/04/2024    HGB 9.3 (L) 06/04/2024    HCT 28.9 (L) 06/04/2024    MCV 81.4 06/04/2024     06/04/2024     Lab Results   Component Value Date/Time     06/04/2024 05:45 AM    K 3.7 06/04/2024 05:45 AM     06/04/2024 05:45 AM    CO2 25 06/04/2024 05:45 AM    BUN 16 06/04/2024 05:45 AM    CREATININE 0.7 06/04/2024 05:45 AM    GLUCOSE 85 06/04/2024 05:45 AM    CALCIUM 8.7 06/04/2024 05:45 AM          ASSESSMENT AND PLAN:  Body mass index is 27.21 kg/m².    She has left knee pain secondary to a tibial plateau fracture, sustained 6/3/24.     Notably, she has a somewhat complex past medical history.     We had a discussion today about the likely diagnosis and its natural history, physical exam and imaging findings, as well as various treatment options in detail.    Surgically, we discussed that likely no surgical invention will be necessary, as I anticipate nonsurgical management will bring about an appropriate result, and I recommended

## 2024-06-04 NOTE — PLAN OF CARE
Patient transferred from [place] ED this shift. Dr. Dietrich was consulted for L knee fracture, no surgical interventions planned at this time, NWB to LLE, knee immobilizer okay to come off for hygiene, f/u in two weeks. Patient is awaiting therapy eval for possible placement.    Problem: Discharge Planning  Goal: Discharge to home or other facility with appropriate resources  Outcome: Progressing  Flowsheets  Taken 6/4/2024 1735 by Alka Garland, IMELDA  Discharge to home or other facility with appropriate resources:   Identify barriers to discharge with patient and caregiver   Arrange for needed discharge resources and transportation as appropriate  Taken 6/4/2024 1015 by Farzaneh Kearney RN  Discharge to home or other facility with appropriate resources:   Identify barriers to discharge with patient and caregiver   Arrange for needed discharge resources and transportation as appropriate   Identify discharge learning needs (meds, wound care, etc)     Problem: Pain  Goal: Verbalizes/displays adequate comfort level or baseline comfort level  Outcome: Progressing  Flowsheets  Taken 6/4/2024 1735  Verbalizes/displays adequate comfort level or baseline comfort level:   Encourage patient to monitor pain and request assistance   Assess pain using appropriate pain scale   Administer analgesics based on type and severity of pain and evaluate response  Taken 6/4/2024 1315  Verbalizes/displays adequate comfort level or baseline comfort level:   Encourage patient to monitor pain and request assistance   Assess pain using appropriate pain scale   Administer analgesics based on type and severity of pain and evaluate response   Implement non-pharmacological measures as appropriate and evaluate response     Problem: Safety - Adult  Goal: Free from fall injury  Outcome: Progressing  Flowsheets (Taken 6/4/2024 1735)  Free From Fall Injury:   Instruct family/caregiver on patient safety   Based on caregiver fall risk screen, instruct

## 2024-06-05 PROBLEM — R74.8 ELEVATED CREATINE KINASE LEVEL: Status: ACTIVE | Noted: 2024-06-05

## 2024-06-05 LAB
ANION GAP SERPL CALCULATED.3IONS-SCNC: 8 MMOL/L (ref 9–17)
BUN SERPL-MCNC: 18 MG/DL (ref 8–23)
BUN/CREAT SERPL: 18 (ref 9–20)
CALCIUM SERPL-MCNC: 8.2 MG/DL (ref 8.6–10.4)
CHLORIDE SERPL-SCNC: 106 MMOL/L (ref 98–107)
CO2 SERPL-SCNC: 27 MMOL/L (ref 20–31)
CREAT SERPL-MCNC: 1 MG/DL (ref 0.5–0.9)
ERYTHROCYTE [DISTWIDTH] IN BLOOD BY AUTOMATED COUNT: 15.1 % (ref 11.8–14.4)
GFR, ESTIMATED: 56 ML/MIN/1.73M2
GLUCOSE BLD-MCNC: 101 MG/DL (ref 65–105)
GLUCOSE BLD-MCNC: 154 MG/DL (ref 65–105)
GLUCOSE BLD-MCNC: 195 MG/DL (ref 65–105)
GLUCOSE BLD-MCNC: 209 MG/DL (ref 65–105)
GLUCOSE SERPL-MCNC: 121 MG/DL (ref 70–99)
HCT VFR BLD AUTO: 30.5 % (ref 36.3–47.1)
HGB BLD-MCNC: 9 G/DL (ref 11.9–15.1)
MCH RBC QN AUTO: 25.9 PG (ref 25.2–33.5)
MCHC RBC AUTO-ENTMCNC: 29.5 G/DL (ref 28.4–34.8)
MCV RBC AUTO: 87.9 FL (ref 82.6–102.9)
NRBC BLD-RTO: 0 PER 100 WBC
PLATELET # BLD AUTO: 193 K/UL (ref 138–453)
PMV BLD AUTO: 11 FL (ref 8.1–13.5)
POTASSIUM SERPL-SCNC: 4.4 MMOL/L (ref 3.7–5.3)
RBC # BLD AUTO: 3.47 M/UL (ref 3.95–5.11)
SODIUM SERPL-SCNC: 141 MMOL/L (ref 135–144)
WBC OTHER # BLD: 6.4 K/UL (ref 3.5–11.3)

## 2024-06-05 PROCEDURE — 36415 COLL VENOUS BLD VENIPUNCTURE: CPT

## 2024-06-05 PROCEDURE — 2580000003 HC RX 258: Performed by: NURSE PRACTITIONER

## 2024-06-05 PROCEDURE — 85027 COMPLETE CBC AUTOMATED: CPT

## 2024-06-05 PROCEDURE — 1200000000 HC SEMI PRIVATE

## 2024-06-05 PROCEDURE — 97167 OT EVAL HIGH COMPLEX 60 MIN: CPT

## 2024-06-05 PROCEDURE — 80048 BASIC METABOLIC PNL TOTAL CA: CPT

## 2024-06-05 PROCEDURE — 6370000000 HC RX 637 (ALT 250 FOR IP): Performed by: NURSE PRACTITIONER

## 2024-06-05 PROCEDURE — 94761 N-INVAS EAR/PLS OXIMETRY MLT: CPT

## 2024-06-05 PROCEDURE — 97530 THERAPEUTIC ACTIVITIES: CPT

## 2024-06-05 PROCEDURE — 94640 AIRWAY INHALATION TREATMENT: CPT

## 2024-06-05 PROCEDURE — 97163 PT EVAL HIGH COMPLEX 45 MIN: CPT

## 2024-06-05 PROCEDURE — 6360000002 HC RX W HCPCS: Performed by: NURSE PRACTITIONER

## 2024-06-05 PROCEDURE — 6370000000 HC RX 637 (ALT 250 FOR IP)

## 2024-06-05 PROCEDURE — 99232 SBSQ HOSP IP/OBS MODERATE 35: CPT | Performed by: NURSE PRACTITIONER

## 2024-06-05 PROCEDURE — 6370000000 HC RX 637 (ALT 250 FOR IP): Performed by: FAMILY MEDICINE

## 2024-06-05 PROCEDURE — 82947 ASSAY GLUCOSE BLOOD QUANT: CPT

## 2024-06-05 PROCEDURE — 97535 SELF CARE MNGMENT TRAINING: CPT

## 2024-06-05 PROCEDURE — 97110 THERAPEUTIC EXERCISES: CPT

## 2024-06-05 RX ORDER — HEPARIN SODIUM 5000 [USP'U]/ML
5000 INJECTION, SOLUTION INTRAVENOUS; SUBCUTANEOUS EVERY 8 HOURS SCHEDULED
Status: DISCONTINUED | OUTPATIENT
Start: 2024-06-05 | End: 2024-06-07 | Stop reason: HOSPADM

## 2024-06-05 RX ORDER — SODIUM CHLORIDE 9 MG/ML
INJECTION, SOLUTION INTRAVENOUS CONTINUOUS
Status: DISCONTINUED | OUTPATIENT
Start: 2024-06-05 | End: 2024-06-06

## 2024-06-05 RX ORDER — UREA 10 %
325 LOTION (ML) TOPICAL DAILY
Status: DISCONTINUED | OUTPATIENT
Start: 2024-06-05 | End: 2024-06-07 | Stop reason: HOSPADM

## 2024-06-05 RX ORDER — MORPHINE SULFATE 2 MG/ML
2 INJECTION, SOLUTION INTRAMUSCULAR; INTRAVENOUS EVERY 4 HOURS PRN
Status: DISCONTINUED | OUTPATIENT
Start: 2024-06-05 | End: 2024-06-05

## 2024-06-05 RX ORDER — IPRATROPIUM BROMIDE AND ALBUTEROL SULFATE 2.5; .5 MG/3ML; MG/3ML
1 SOLUTION RESPIRATORY (INHALATION)
Status: DISCONTINUED | OUTPATIENT
Start: 2024-06-05 | End: 2024-06-07 | Stop reason: HOSPADM

## 2024-06-05 RX ORDER — SENNA AND DOCUSATE SODIUM 50; 8.6 MG/1; MG/1
2 TABLET, FILM COATED ORAL DAILY PRN
Status: DISCONTINUED | OUTPATIENT
Start: 2024-06-05 | End: 2024-06-07 | Stop reason: HOSPADM

## 2024-06-05 RX ADMIN — SODIUM CHLORIDE: 9 INJECTION, SOLUTION INTRAVENOUS at 09:45

## 2024-06-05 RX ADMIN — SODIUM CHLORIDE, PRESERVATIVE FREE 10 ML: 5 INJECTION INTRAVENOUS at 21:24

## 2024-06-05 RX ADMIN — Medication 1000 UNITS: at 09:46

## 2024-06-05 RX ADMIN — METHIMAZOLE 5 MG: 5 TABLET ORAL at 09:45

## 2024-06-05 RX ADMIN — INSULIN LISPRO 1 UNITS: 100 INJECTION, SOLUTION INTRAVENOUS; SUBCUTANEOUS at 17:48

## 2024-06-05 RX ADMIN — BUDESONIDE AND FORMOTEROL FUMARATE DIHYDRATE 2 PUFF: 160; 4.5 AEROSOL RESPIRATORY (INHALATION) at 19:33

## 2024-06-05 RX ADMIN — OXYCODONE HYDROCHLORIDE AND ACETAMINOPHEN 2 TABLET: 5; 325 TABLET ORAL at 04:29

## 2024-06-05 RX ADMIN — HEPARIN SODIUM 5000 UNITS: 5000 INJECTION INTRAVENOUS; SUBCUTANEOUS at 21:21

## 2024-06-05 RX ADMIN — HEPARIN SODIUM 5000 UNITS: 5000 INJECTION INTRAVENOUS; SUBCUTANEOUS at 15:28

## 2024-06-05 RX ADMIN — SENNOSIDES AND DOCUSATE SODIUM 2 TABLET: 50; 8.6 TABLET ORAL at 10:10

## 2024-06-05 RX ADMIN — FERROUS SULFATE TAB EC 325 MG (65 MG FE EQUIVALENT) 325 MG: 325 (65 FE) TABLET DELAYED RESPONSE at 09:46

## 2024-06-05 RX ADMIN — HEPARIN SODIUM 5000 UNITS: 5000 INJECTION INTRAVENOUS; SUBCUTANEOUS at 09:46

## 2024-06-05 RX ADMIN — PREGABALIN 75 MG: 75 CAPSULE ORAL at 09:45

## 2024-06-05 RX ADMIN — AMLODIPINE BESYLATE 5 MG: 5 TABLET ORAL at 09:46

## 2024-06-05 RX ADMIN — IPRATROPIUM BROMIDE AND ALBUTEROL SULFATE 1 DOSE: .5; 2.5 SOLUTION RESPIRATORY (INHALATION) at 19:32

## 2024-06-05 RX ADMIN — OXYCODONE HYDROCHLORIDE AND ACETAMINOPHEN 2 TABLET: 5; 325 TABLET ORAL at 18:44

## 2024-06-05 RX ADMIN — SODIUM CHLORIDE, PRESERVATIVE FREE 10 ML: 5 INJECTION INTRAVENOUS at 09:42

## 2024-06-05 RX ADMIN — PANTOPRAZOLE SODIUM 40 MG: 40 TABLET, DELAYED RELEASE ORAL at 05:39

## 2024-06-05 RX ADMIN — INSULIN GLARGINE 25 UNITS: 100 INJECTION, SOLUTION SUBCUTANEOUS at 21:21

## 2024-06-05 RX ADMIN — IPRATROPIUM BROMIDE AND ALBUTEROL SULFATE 1 DOSE: .5; 2.5 SOLUTION RESPIRATORY (INHALATION) at 15:31

## 2024-06-05 RX ADMIN — PREGABALIN 75 MG: 75 CAPSULE ORAL at 21:21

## 2024-06-05 RX ADMIN — OXYCODONE HYDROCHLORIDE AND ACETAMINOPHEN 2 TABLET: 5; 325 TABLET ORAL at 11:31

## 2024-06-05 RX ADMIN — ONDANSETRON 4 MG: 4 TABLET, ORALLY DISINTEGRATING ORAL at 10:11

## 2024-06-05 ASSESSMENT — PAIN SCALES - GENERAL
PAINLEVEL_OUTOF10: 5
PAINLEVEL_OUTOF10: 7
PAINLEVEL_OUTOF10: 6
PAINLEVEL_OUTOF10: 8
PAINLEVEL_OUTOF10: 4

## 2024-06-05 ASSESSMENT — PAIN DESCRIPTION - FREQUENCY
FREQUENCY: CONTINUOUS

## 2024-06-05 ASSESSMENT — PAIN DESCRIPTION - ORIENTATION
ORIENTATION: LEFT

## 2024-06-05 ASSESSMENT — PAIN DESCRIPTION - PAIN TYPE
TYPE: ACUTE PAIN

## 2024-06-05 ASSESSMENT — PAIN DESCRIPTION - LOCATION
LOCATION: KNEE

## 2024-06-05 ASSESSMENT — PAIN - FUNCTIONAL ASSESSMENT
PAIN_FUNCTIONAL_ASSESSMENT: PREVENTS OR INTERFERES WITH ALL ACTIVE AND SOME PASSIVE ACTIVITIES
PAIN_FUNCTIONAL_ASSESSMENT: PREVENTS OR INTERFERES WITH ALL ACTIVE AND SOME PASSIVE ACTIVITIES

## 2024-06-05 ASSESSMENT — PAIN DESCRIPTION - ONSET
ONSET: ON-GOING
ONSET: ON-GOING

## 2024-06-05 ASSESSMENT — ENCOUNTER SYMPTOMS
EYES NEGATIVE: 1
ABDOMINAL PAIN: 0
CONSTIPATION: 1
ALLERGIC/IMMUNOLOGIC NEGATIVE: 1

## 2024-06-05 ASSESSMENT — PAIN DESCRIPTION - DESCRIPTORS
DESCRIPTORS: ACHING
DESCRIPTORS: ACHING
DESCRIPTORS: ACHING;TENDER

## 2024-06-05 NOTE — PROGRESS NOTES
Patient had a 50 dollar bill laying on bedside table along with a debit card. Patient encouraged to put money and card in purse. Patient agreed, both put in purse. Purse with patient.

## 2024-06-05 NOTE — PLAN OF CARE
Problem: Respiratory - Adult  Goal: Able to breathe comfortably  Outcome: Progressing     Problem: Respiratory - Adult  Goal: Patient's breath sounds will be clear and equal  Outcome: Progressing     Problem: Respiratory - Adult  Goal: Achieves optimal ventilation and oxygenation  Outcome: Progressing  Flowsheets (Taken 6/5/2024 1000 by Vickie James RN)  Achieves optimal ventilation and oxygenation:   Assess for changes in respiratory status   Assess for changes in mentation and behavior   Position to facilitate oxygenation and minimize respiratory effort   Assess and instruct to report shortness of breath or any respiratory difficulty   Respiratory therapy support as indicated

## 2024-06-05 NOTE — PLAN OF CARE
Problem: Pain  Goal: Verbalizes/displays adequate comfort level or baseline comfort level  6/5/2024 0532 by Kira Cochran RN  Outcome: Progressing  Flowsheets (Taken 6/5/2024 0532)  Verbalizes/displays adequate comfort level or baseline comfort level:   Encourage patient to monitor pain and request assistance   Assess pain using appropriate pain scale   Administer analgesics based on type and severity of pain and evaluate response   Implement non-pharmacological measures as appropriate and evaluate response   Consider cultural and social influences on pain and pain management   Notify Licensed Independent Practitioner if interventions unsuccessful or patient reports new pain

## 2024-06-05 NOTE — PROGRESS NOTES
Patient doing okay. Was given pain med as the pain was at 7/10 during assessment. Voiding well and tolerating diet. No BM yet. Will continue to monitor the patient.

## 2024-06-05 NOTE — PROGRESS NOTES
SPIRITUAL CARE DEPARTMENT [facility]  PROGRESS NOTE    Room # 2104/2104-01   Name: Sumaya Castillo            Orthodox: [employer]beterian     Reason for visit:  Rounds    I visited the patient.    Admit Date & Time: 6/4/2024 12:14 AM    Assessment:  Sumaya Castillo is a 83 y.o. female in the hospital because Closed Fracture of Left Tibial Plateau. Upon entering the room Pt. Asleep      Intervention:  I introduced myself and my title as    As Pt. Was asleep, I dropped off Prayer card    Outcome:  Concluded Visit with Prayer Outside Doorway    Plan:  Chaplains will remain available to offer spiritual and emotional support as needed.    Electronically signed by Chaplain Ana Lilia, on 6/4/2024 at 8:28 PM.  Spiritual Care Department  [facility]       06/04/24 2025   Encounter Summary   Encounter Overview/Reason Initial Encounter;Spiritual/Emotional Needs   Service Provided For Patient   Referral/Consult From Other (comment)  (Consult)   Support System Unknown   Last Encounter  06/04/24   Complexity of Encounter Low   Begin Time 2017   End Time  2022   Total Time Calculated 5 min   Spiritual/Emotional needs   Type Spiritual Support   Assessment/Intervention/Outcome   Assessment Unable to assess   Intervention Prayer (assurance of)/[place];Sustaining Presence/Ministry of presence;Other (Comment)  (Dropped Off Prayer Card)   Outcome Other (comment)  (Pt. Asleep)   Plan and Referrals   Plan/Referrals No future visits requested

## 2024-06-05 NOTE — PLAN OF CARE
Problem: Respiratory - Adult  Goal: Able to breathe comfortably  Outcome: Progressing     Problem: Respiratory - Adult  Goal: Patient's breath sounds will be clear and equal  Outcome: Progressing         BRONCHOSPASM/BRONCHOCONSTRICTION    IMPROVE  AERATION/BREATHSOUNDS  ADMINISTER BRONCHODILATOR THERAPY AS APPROPRIATE  ASSESS BREATH SOUNDS  INITIATE AEROSOL PROTOCOL IF ORDERED TO DO SO  PATIENT EDUCATION AS NEEDED

## 2024-06-05 NOTE — PROGRESS NOTES
Covid 19 swab taken from left nare, labeled, placed in red dot bag, and handed off to second healthcare worker outside of room for transport to laboratory per hospital policy and procedure. Patient tolerated procedure well.    **SWABBED FOR HOME CPAP USE**  (Pt to have home CPAP unit brought in tomorrow.)

## 2024-06-05 NOTE — PLAN OF CARE
Tolerated lunch fine without nausea. Pain at reassessment was 4-5/10. No BM yet. No shortness of breath reported. Will continue to monitor the patient.    Problem: Discharge Planning  Goal: Discharge to home or other facility with appropriate resources  Outcome: Progressing  Flowsheets (Taken 6/5/2024 1000)  Discharge to home or other facility with appropriate resources:   Identify barriers to discharge with patient and caregiver   Arrange for needed discharge resources and transportation as appropriate   Identify discharge learning needs (meds, wound care, etc)   Refer to discharge planning if patient needs post-hospital services based on physician order or complex needs related to functional status, cognitive ability or social support system     Problem: Pain  Goal: Verbalizes/displays adequate comfort level or baseline comfort level  6/5/2024 1423 by Vickie James, RN  Outcome: Progressing  6/5/2024 0532 by Kira Cochran, RN  Outcome: Progressing  Flowsheets (Taken 6/5/2024 0532)  Verbalizes/displays adequate comfort level or baseline comfort level:   Encourage patient to monitor pain and request assistance   Assess pain using appropriate pain scale   Administer analgesics based on type and severity of pain and evaluate response   Implement non-pharmacological measures as appropriate and evaluate response   Consider cultural and social influences on pain and pain management   Notify Licensed Independent Practitioner if interventions unsuccessful or patient reports new pain     Problem: Safety - Adult  Goal: Free from fall injury  Outcome: Progressing     Problem: ABCDS Injury Assessment  Goal: Absence of physical injury  Outcome: Progressing     Problem: Chronic Conditions and Co-morbidities  Goal: Patient's chronic conditions and co-morbidity symptoms are monitored and maintained or improved  Outcome: Progressing  Flowsheets (Taken 6/5/2024 1000)  Care Plan - Patient's Chronic Conditions and Co-Morbidity

## 2024-06-05 NOTE — PLAN OF CARE
Problem: Respiratory - Adult  Goal: Able to breathe comfortably  6/5/2024 1934 by Jake Wick RCP  Outcome: Progressing     Problem: Respiratory - Adult  Goal: Patient's breath sounds will be clear and equal  6/5/2024 1934 by Jake Wick RCP  Outcome: Progressing     Problem: Respiratory - Adult  Goal: Achieves optimal ventilation and oxygenation  6/5/2024 1934 by Jake Wick RCP  Outcome: Progressing

## 2024-06-05 NOTE — PROGRESS NOTES
Physical Therapy  Facility/Department: [facility] SURG  Physical Therapy Initial Assessment    Name: Sumaya Castillo  : 1940  MRN: [id number]  Date of Service: 2024    Discharge Recommendations:  Patient would benefit from continued therapy after discharge         Pt presented to [facility] ED on 24 with c/o left knee pain and swelling after fall.  Pt was at the [facility] on Monday afternoon and unfortunately lost her balance and fell hitting her left knee.  She denies feeling lightheaded, dizzy, or hitting her head when she fell.  There was no LOC.  She states the swelling and swelling in the left knee area gradually increased throughout the day and it became difficult to ambulate so she elected to come in for evaluation.  An x-ray of the left knee showed a possible nondisplaced proximal tibial fracture and a small effusion.  CT confirmed a nondisplaced tibial plateau fracture; While in the [place] ED, staff attempted to ambulate the patient but she was unable and she is concerned of taking care of herself so the decision was made to admit for Ortho evaluation.    Dr. Dietrich was consulted.    Pt transferred to [facility] to be admitted for further medical management of Tibial plateau fracture, left, closed.     Pt seen by ortho on 24 and plan is NO surgical management, NWB with Left knee immobilizer and pain control.    RN reports patient is medically stable for therapy treatment this date.    Chart reviewed prior to treatment and patient is agreeable for therapy.          Patient Diagnosis(es): The encounter diagnosis was Closed fracture of left tibial plateau, initial encounter.  Past Medical History:  has a past medical history of Asthma, COPD (chronic obstructive pulmonary disease) (HCC), Diabetes mellitus (HCC), History of blood transfusion, and Hypertension.  Past Surgical History:  has a past surgical history that includes Cardiac defibrillator placement; Abdomen surgery; and Carotid  T-Scale Score : 30.55  Mobility Inpatient CMS 0-100% Score: 81.38  Mobility Inpatient CMS G-Code Modifier : CM         Tinneti Score       Goals  Short Term Goals  Time Frame for Short Term Goals: 12 visits  Short Term Goal 1: Ind bed mobility to modified Indep  Short Term Goal 2: Pt able to transfer bed to chair with R/W and CGA  Short Term Goal 3: Inc gait to amb at least 20ft w/ RW and safely maintaining NWB of LLE and CGA, & able to demonstrate indep/ safe use of RW in functional activities including approaching surfaces and turning to sit.  Short Term Goal 4: Pt able to go up/down 4 steps with bumping technique with CGA to safely enter/exit her home and maintain NWB of LLE  Short Term Goal 5: Inc strength of uninvolved joints to WFL,  & standing balance to good with R/W while maintaining NWB of LLE to facilitate pt independence for performance of ADL's & functional mobility, & reduce fall risk  Additional Goals?: Yes  Short Term Goal 6: Pt able to tolerate 30-40 min of activity to include 15-20 reps of ex, balance and endurance training,  & functional mobility with R/W to facilitate activity tolerance to WFL  Short Term Goal 7: Ed pt on home ex's, safety, balance & endurance training, proper pacing and breathing techniques, pressure relief with Pt able to demonstrate effective pressure relief techniques to reduce risk of skin breakdown,, fall prevention, & issue written Pt Ed  Patient Goals   Patient Goals : able to move independently & take care of myself       Education  Patient Education  Education Given To: Patient  Education Provided: Role of Therapy;Transfer Training;Equipment;Plan of Care;Energy Conservation;Fall Prevention Strategies  Education Provided Comments: Pt educated on purpose of PT eval, importance of continued mobility throughout admission, safety awareness, fall risk prevention, safe bed mobility and transfers w/ EVANJAVI COLIN, NWB of LLE, circulation ex's, pressure relief and breathing

## 2024-06-05 NOTE — PROGRESS NOTES
[facility]  Office: [phone]  Pj Oden DO, Jesús Ocampo DO, Emerson Lucero DO, Dom Brian, DO, Zuleyka Curry MD, Estella Fountain MD, Nicholas Mcarthur MD, Trudy Morelos MD,  Osman Boggs MD, Romain Ballesteros MD, Lamberto Patterson MD,  Anand Loco DO, Jennifer Dotson MD, Chava Phillips MD, David Oden DO, Jocelyn Lezama MD,  Blaise Philip DO, Uzma Miller MD, Josephine Fairchild MD, Jennifer Barr MD, Lazaro Goldstein MD,  Abhi Lua MD, Víctor Cohn MD, Shanell Seth MD, Ambreen Chester MD, Atilio Patino MD, Sanjay Rincon MD, Chris Clarke DO, Bob Valverde DO, Ashley Porras MD,  Jairon Blair MD, Shirley Waterhouse, CNP,  Loren Lobo CNP, Jam Fernandes, CNP,  Fiona Thompson, DNP, Radha Varghese, CNP, Sujatha Garza, CNP, Gilda Durham, CNP, Estephanie Santoyo, CNP, Astrid Jade, PA-C, Rani Handy PA-C, Stephanie Orona, CNP, Malorie Montoya, CNP, Laila Abdi, CNP, Carmen Ayala, CNP, Kayleen Khan, CNP, Génesis Velasquez, CNS, Jenna Trotter, CNP, Brandy Jay CNP, Tracy Schwab, CNP         [facility]   IN-PATIENT SERVICE   [facility]    Progress Note    6/5/2024    12:54 PM    Name:   Sumaya Castillo  MRN:     [id number]     Acct:      [id number]   Room:   2104/2104-01   Day:  1  Admit Date:  6/4/2024 12:14 AM    PCP:   Marlen García MD  Code Status:  Full Code    Subjective:     C/C:   Chief Complaint   Patient presents with    Fall    Knee Pain     Interval History Status: stable    Upon exam this date, patient is resting in her recliner chair with the left immobilizer in place.  Patient is tearful due to pain in LLE.  Patient encouraged to request pain medication as needed.  Patient also reports having nausea.  Patient stated she is not drinking fluids because she is \"afraid of moving to [urinate] because it's so painful.\"  RN at bedside and plan of care discussed with patient.  Will utilize purewick for patient comfort.  PRNs for pain  and nausea given.  IS demonstrated and encouraged. IV fluids stopped due to patient reporting increased shortness of breath.  Nebulizer treatments ordered. Creat slightly elevated at 1.0, encourage patient to increase fluid intake while monitoring for shortness of breath.  Will repeat BMP in the morning.  Avoid nephrotoxic medications.  Awaiting placement for rehab.      Brief History:     6/4: Per previous note: \"Sumaya Castillo is a 83 y.o.  /  female who presents with Fall and Knee Pain   and is admitted to the hospital for the management of Tibial plateau fracture, left, closed, initial encounter.     The patient presented to [place] ER related to left knee pain and swelling after fall.  Greater than notes patient was at the [facility] on Monday afternoon and unfortunately lost her balance and fell hitting her left knee.  She denies feeling lightheaded, dizzy, or hitting her head when she fell.  There was no LOC.  She states the swelling and swelling in the left knee area gradually increased throughout the day and it became difficult to ambulate so she elected to come in for evaluation.  An x-ray of the left knee showed a possible nondisplaced proximal tibial fracture and a small effusion.  CT confirmed a nondisplaced tibial plateau fracture; line extends to the proximal diaphysis posteriorly.  They also note a small to moderate lipohemarthrosis.  While in the [place] ER they attempted to ambulate the patient but she was unable and she is concerned of taking care of herself so the decision was made to admit for Ortho evaluation.  Dr. Dietrich was consulted.  Chronic conditions include type 2 diabetes with long-term insulin use, subclinical hypothyroidism, carotid endarterectomy, AMADO, essential hypertension, COPD, CKD, CAD, left bundle branch block, and nonischemic cardiomyopath with history of biventricular ICD in 2014.  Echo in August 2023 showed her systolic function was low normal with an EF of 50 to

## 2024-06-05 NOTE — PROGRESS NOTES
Occupational Therapy  Facility/Department: [facility] MED SURG  Occupational Therapy Initial Assessment    Name: Sumaya Castillo  : 1940  MRN: [id number]  Date of Service: 2024      IMELDA PINA reports patient is medically stable for therapy treatment this date.    Chart reviewed prior to treatment and patient is agreeable for therapy.  All lines intact and patient positioned comfortably at end of treatment.  All patient needs addressed prior to ending therapy session.      Pt currently functioning below baseline.  Recommend daily inpatient skilled therapy at time of discharge to maximize long term outcomes and prevent re-admission. Please refer to AM-PAC score for current level of function.        Discharge Recommendations:  Patient would benefit from continued therapy after discharge  OT Equipment Recommendations  Equipment Needed:  (CTA)       Patient Diagnosis(es): The encounter diagnosis was Closed fracture of left tibial plateau, initial encounter.  Past Medical History:  has a past medical history of Asthma, COPD (chronic obstructive pulmonary disease) (HCC), Diabetes mellitus (HCC), History of blood transfusion, and Hypertension.  Past Surgical History:  has a past surgical history that includes Cardiac defibrillator placement; Abdomen surgery; and Carotid endarterectomy.      PER H&P:    Sumaya Castillo is a 83 y.o.  /  female who presents with Fall and Knee Pain   and is admitted to the hospital for the management of Tibial plateau fracture, left, closed, initial encounter.     The patient presented to [place] ER related to left knee pain and swelling after fall.  Greater than [place] and unfortunately lost her balance and fell hitting her left knee.  She denies feeling lightheaded, dizzy, or hitting her head when she fell.  There was no LOC.  She states the swelling and swelling in the left knee area gradually increased throughout the day and it became  difficult to ambulate so she elected to come in for evaluation.  An x-ray of the left knee showed a possible nondisplaced proximal tibial fracture and a small effusion.  CT confirmed a nondisplaced tibial plateau fracture; line extends to the proximal diaphysis posteriorly.  They also note a small to moderate lipohemarthrosis.  While in the [place] ER they attempted to ambulate the patient but she was unable and she is concerned of taking care of herself so the decision was made to admit for Ortho evaluation.  Dr. Dietrich was consulted.  Chronic conditions include type 2 diabetes with long-term insulin use, subclinical hypothyroidism, carotid endarterectomy, AMADO, essential hypertension, COPD, CKD, CAD, left bundle branch block, and nonischemic cardiomyopath with history of biventricular ICD in 2014.  Echo in August 2023 showed her systolic function was low normal with an EF of 50 to 55% and grade 1 mild diastolic dysfunction.  Other history includes an AVM and GI bleed requiring transfusion.  She follows with [facility] cardiology/Dr. Gibbons      Assessment   Performance deficits / Impairments: Decreased functional mobility ;Decreased safe awareness;Decreased balance;Decreased coordination;Decreased ADL status;Decreased vision/visual deficit;Decreased cognition;Decreased posture;Decreased endurance;Decreased high-level IADLs;Decreased strength;Decreased sensation;Decreased fine motor control  Assessment: Pt is a high fall risk, LLE is NWB, has overall weakness, decreased safety awareness/cognitive deficits, decreased balance/act tolerance and requires assist of 2 staff when up with use of susie stedy lift.  Skilled OT is recommended to maximize functional I/safety as able.  Prognosis: Fair;Good  Decision Making: High Complexity  REQUIRES OT FOLLOW-UP: Yes  Activity Tolerance  Activity Tolerance: Patient limited by fatigue;Patient limited by pain;Treatment limited secondary to decreased cognition  Activity Tolerance

## 2024-06-06 LAB
ANION GAP SERPL CALCULATED.3IONS-SCNC: 11 MMOL/L (ref 9–17)
ANION GAP SERPL CALCULATED.3IONS-SCNC: 6 MMOL/L (ref 9–17)
BUN SERPL-MCNC: 24 MG/DL (ref 8–23)
BUN SERPL-MCNC: 27 MG/DL (ref 8–23)
BUN/CREAT SERPL: 20 (ref 9–20)
BUN/CREAT SERPL: 23 (ref 9–20)
CALCIUM SERPL-MCNC: 7.7 MG/DL (ref 8.6–10.4)
CALCIUM SERPL-MCNC: 8.1 MG/DL (ref 8.6–10.4)
CHLORIDE SERPL-SCNC: 105 MMOL/L (ref 98–107)
CHLORIDE SERPL-SCNC: 106 MMOL/L (ref 98–107)
CO2 SERPL-SCNC: 21 MMOL/L (ref 20–31)
CO2 SERPL-SCNC: 27 MMOL/L (ref 20–31)
CREAT SERPL-MCNC: 1.2 MG/DL (ref 0.5–0.9)
CREAT SERPL-MCNC: 1.2 MG/DL (ref 0.5–0.9)
ERYTHROCYTE [DISTWIDTH] IN BLOOD BY AUTOMATED COUNT: 15 % (ref 11.8–14.4)
FERRITIN SERPL-MCNC: 49 NG/ML (ref 13–150)
FOLATE SERPL-MCNC: 18.6 NG/ML (ref 4.8–24.2)
GFR, ESTIMATED: 45 ML/MIN/1.73M2
GFR, ESTIMATED: 45 ML/MIN/1.73M2
GLUCOSE BLD-MCNC: 169 MG/DL (ref 65–105)
GLUCOSE BLD-MCNC: 175 MG/DL (ref 65–105)
GLUCOSE BLD-MCNC: 177 MG/DL (ref 65–105)
GLUCOSE BLD-MCNC: 233 MG/DL (ref 65–105)
GLUCOSE SERPL-MCNC: 151 MG/DL (ref 70–99)
GLUCOSE SERPL-MCNC: 209 MG/DL (ref 70–99)
HCT VFR BLD AUTO: 32.4 % (ref 36.3–47.1)
HGB BLD-MCNC: 9.5 G/DL (ref 11.9–15.1)
IRON SATN MFR SERPL: 10 % (ref 20–55)
IRON SERPL-MCNC: 32 UG/DL (ref 37–145)
MCH RBC QN AUTO: 26.2 PG (ref 25.2–33.5)
MCHC RBC AUTO-ENTMCNC: 29.3 G/DL (ref 28.4–34.8)
MCV RBC AUTO: 89.3 FL (ref 82.6–102.9)
NRBC BLD-RTO: 0 PER 100 WBC
PLATELET # BLD AUTO: 210 K/UL (ref 138–453)
PMV BLD AUTO: 10.1 FL (ref 8.1–13.5)
POTASSIUM SERPL-SCNC: 4.9 MMOL/L (ref 3.7–5.3)
POTASSIUM SERPL-SCNC: 5 MMOL/L (ref 3.7–5.3)
RBC # BLD AUTO: 3.63 M/UL (ref 3.95–5.11)
SODIUM SERPL-SCNC: 137 MMOL/L (ref 135–144)
SODIUM SERPL-SCNC: 139 MMOL/L (ref 135–144)
TIBC SERPL-MCNC: 309 UG/DL (ref 250–450)
UNSATURATED IRON BINDING CAPACITY: 277 UG/DL (ref 112–347)
VIT B12 SERPL-MCNC: 371 PG/ML (ref 232–1245)
WBC OTHER # BLD: 8 K/UL (ref 3.5–11.3)

## 2024-06-06 PROCEDURE — 94761 N-INVAS EAR/PLS OXIMETRY MLT: CPT

## 2024-06-06 PROCEDURE — 83540 ASSAY OF IRON: CPT

## 2024-06-06 PROCEDURE — 97110 THERAPEUTIC EXERCISES: CPT

## 2024-06-06 PROCEDURE — 6370000000 HC RX 637 (ALT 250 FOR IP): Performed by: NURSE PRACTITIONER

## 2024-06-06 PROCEDURE — 6370000000 HC RX 637 (ALT 250 FOR IP)

## 2024-06-06 PROCEDURE — 97112 NEUROMUSCULAR REEDUCATION: CPT

## 2024-06-06 PROCEDURE — 82728 ASSAY OF FERRITIN: CPT

## 2024-06-06 PROCEDURE — 6370000000 HC RX 637 (ALT 250 FOR IP): Performed by: FAMILY MEDICINE

## 2024-06-06 PROCEDURE — 36415 COLL VENOUS BLD VENIPUNCTURE: CPT

## 2024-06-06 PROCEDURE — 2580000003 HC RX 258: Performed by: NURSE PRACTITIONER

## 2024-06-06 PROCEDURE — 97530 THERAPEUTIC ACTIVITIES: CPT

## 2024-06-06 PROCEDURE — 97535 SELF CARE MNGMENT TRAINING: CPT

## 2024-06-06 PROCEDURE — 1200000000 HC SEMI PRIVATE

## 2024-06-06 PROCEDURE — 82947 ASSAY GLUCOSE BLOOD QUANT: CPT

## 2024-06-06 PROCEDURE — 83550 IRON BINDING TEST: CPT

## 2024-06-06 PROCEDURE — 82746 ASSAY OF FOLIC ACID SERUM: CPT

## 2024-06-06 PROCEDURE — 99232 SBSQ HOSP IP/OBS MODERATE 35: CPT | Performed by: NURSE PRACTITIONER

## 2024-06-06 PROCEDURE — 6360000002 HC RX W HCPCS: Performed by: NURSE PRACTITIONER

## 2024-06-06 PROCEDURE — 94640 AIRWAY INHALATION TREATMENT: CPT

## 2024-06-06 PROCEDURE — 82607 VITAMIN B-12: CPT

## 2024-06-06 PROCEDURE — 80048 BASIC METABOLIC PNL TOTAL CA: CPT

## 2024-06-06 PROCEDURE — 85027 COMPLETE CBC AUTOMATED: CPT

## 2024-06-06 RX ORDER — SENNA AND DOCUSATE SODIUM 50; 8.6 MG/1; MG/1
2 TABLET, FILM COATED ORAL DAILY PRN
Status: DISCONTINUED | OUTPATIENT
Start: 2024-06-06 | End: 2024-06-06

## 2024-06-06 RX ORDER — SENNA AND DOCUSATE SODIUM 50; 8.6 MG/1; MG/1
2 TABLET, FILM COATED ORAL ONCE
Status: COMPLETED | OUTPATIENT
Start: 2024-06-06 | End: 2024-06-06

## 2024-06-06 RX ORDER — POLYETHYLENE GLYCOL 3350 17 G/17G
17 POWDER, FOR SOLUTION ORAL DAILY
Status: DISCONTINUED | OUTPATIENT
Start: 2024-06-06 | End: 2024-06-07 | Stop reason: HOSPADM

## 2024-06-06 RX ORDER — INSULIN GLARGINE 100 [IU]/ML
36 INJECTION, SOLUTION SUBCUTANEOUS NIGHTLY
Status: DISCONTINUED | OUTPATIENT
Start: 2024-06-06 | End: 2024-06-07 | Stop reason: HOSPADM

## 2024-06-06 RX ORDER — SODIUM CHLORIDE 9 MG/ML
INJECTION, SOLUTION INTRAVENOUS CONTINUOUS
Status: DISCONTINUED | OUTPATIENT
Start: 2024-06-06 | End: 2024-06-07 | Stop reason: HOSPADM

## 2024-06-06 RX ADMIN — AMLODIPINE BESYLATE 5 MG: 5 TABLET ORAL at 08:16

## 2024-06-06 RX ADMIN — FERROUS SULFATE TAB EC 325 MG (65 MG FE EQUIVALENT) 325 MG: 325 (65 FE) TABLET DELAYED RESPONSE at 08:16

## 2024-06-06 RX ADMIN — SENNOSIDES AND DOCUSATE SODIUM 2 TABLET: 50; 8.6 TABLET ORAL at 11:56

## 2024-06-06 RX ADMIN — Medication 1000 UNITS: at 08:16

## 2024-06-06 RX ADMIN — BUDESONIDE AND FORMOTEROL FUMARATE DIHYDRATE 2 PUFF: 160; 4.5 AEROSOL RESPIRATORY (INHALATION) at 19:30

## 2024-06-06 RX ADMIN — HEPARIN SODIUM 5000 UNITS: 5000 INJECTION INTRAVENOUS; SUBCUTANEOUS at 06:17

## 2024-06-06 RX ADMIN — POLYETHYLENE GLYCOL 3350 17 G: 17 POWDER, FOR SOLUTION ORAL at 08:15

## 2024-06-06 RX ADMIN — PANTOPRAZOLE SODIUM 40 MG: 40 TABLET, DELAYED RELEASE ORAL at 06:17

## 2024-06-06 RX ADMIN — OXYCODONE HYDROCHLORIDE AND ACETAMINOPHEN 2 TABLET: 5; 325 TABLET ORAL at 08:15

## 2024-06-06 RX ADMIN — PREGABALIN 75 MG: 75 CAPSULE ORAL at 08:16

## 2024-06-06 RX ADMIN — IPRATROPIUM BROMIDE AND ALBUTEROL SULFATE 1 DOSE: .5; 2.5 SOLUTION RESPIRATORY (INHALATION) at 16:41

## 2024-06-06 RX ADMIN — IPRATROPIUM BROMIDE AND ALBUTEROL SULFATE 1 DOSE: .5; 2.5 SOLUTION RESPIRATORY (INHALATION) at 11:26

## 2024-06-06 RX ADMIN — OXYCODONE HYDROCHLORIDE AND ACETAMINOPHEN 2 TABLET: 5; 325 TABLET ORAL at 16:24

## 2024-06-06 RX ADMIN — MORPHINE SULFATE 2 MG: 2 INJECTION, SOLUTION INTRAMUSCULAR; INTRAVENOUS at 10:48

## 2024-06-06 RX ADMIN — HEPARIN SODIUM 5000 UNITS: 5000 INJECTION INTRAVENOUS; SUBCUTANEOUS at 21:50

## 2024-06-06 RX ADMIN — SODIUM CHLORIDE, PRESERVATIVE FREE 10 ML: 5 INJECTION INTRAVENOUS at 08:00

## 2024-06-06 RX ADMIN — OXYCODONE HYDROCHLORIDE AND ACETAMINOPHEN 2 TABLET: 5; 325 TABLET ORAL at 01:04

## 2024-06-06 RX ADMIN — SODIUM CHLORIDE: 9 INJECTION, SOLUTION INTRAVENOUS at 21:55

## 2024-06-06 RX ADMIN — IPRATROPIUM BROMIDE AND ALBUTEROL SULFATE 1 DOSE: .5; 2.5 SOLUTION RESPIRATORY (INHALATION) at 07:23

## 2024-06-06 RX ADMIN — IPRATROPIUM BROMIDE AND ALBUTEROL SULFATE 1 DOSE: .5; 2.5 SOLUTION RESPIRATORY (INHALATION) at 19:30

## 2024-06-06 RX ADMIN — ONDANSETRON 4 MG: 4 TABLET, ORALLY DISINTEGRATING ORAL at 10:48

## 2024-06-06 RX ADMIN — SODIUM CHLORIDE: 9 INJECTION, SOLUTION INTRAVENOUS at 10:34

## 2024-06-06 RX ADMIN — SODIUM CHLORIDE, PRESERVATIVE FREE 10 ML: 5 INJECTION INTRAVENOUS at 21:51

## 2024-06-06 RX ADMIN — HEPARIN SODIUM 5000 UNITS: 5000 INJECTION INTRAVENOUS; SUBCUTANEOUS at 14:19

## 2024-06-06 RX ADMIN — PREGABALIN 75 MG: 75 CAPSULE ORAL at 21:00

## 2024-06-06 RX ADMIN — INSULIN GLARGINE 36 UNITS: 100 INJECTION, SOLUTION SUBCUTANEOUS at 21:00

## 2024-06-06 RX ADMIN — BUDESONIDE AND FORMOTEROL FUMARATE DIHYDRATE 2 PUFF: 160; 4.5 AEROSOL RESPIRATORY (INHALATION) at 07:25

## 2024-06-06 RX ADMIN — METHIMAZOLE 5 MG: 5 TABLET ORAL at 08:15

## 2024-06-06 ASSESSMENT — PAIN SCALES - GENERAL
PAINLEVEL_OUTOF10: 6
PAINLEVEL_OUTOF10: 7
PAINLEVEL_OUTOF10: 6
PAINLEVEL_OUTOF10: 7
PAINLEVEL_OUTOF10: 6
PAINLEVEL_OUTOF10: 0
PAINLEVEL_OUTOF10: 7
PAINLEVEL_OUTOF10: 8

## 2024-06-06 ASSESSMENT — PAIN - FUNCTIONAL ASSESSMENT
PAIN_FUNCTIONAL_ASSESSMENT: PREVENTS OR INTERFERES WITH ALL ACTIVE AND SOME PASSIVE ACTIVITIES
PAIN_FUNCTIONAL_ASSESSMENT: PREVENTS OR INTERFERES SOME ACTIVE ACTIVITIES AND ADLS
PAIN_FUNCTIONAL_ASSESSMENT: PREVENTS OR INTERFERES WITH MANY ACTIVE NOT PASSIVE ACTIVITIES

## 2024-06-06 ASSESSMENT — PAIN SCALES - WONG BAKER: WONGBAKER_NUMERICALRESPONSE: NO HURT

## 2024-06-06 ASSESSMENT — PAIN DESCRIPTION - DESCRIPTORS
DESCRIPTORS: ACHING;SHARP

## 2024-06-06 ASSESSMENT — PAIN DESCRIPTION - LOCATION
LOCATION: KNEE

## 2024-06-06 ASSESSMENT — PAIN DESCRIPTION - ORIENTATION
ORIENTATION: LEFT
ORIENTATION: LEFT;ANTERIOR
ORIENTATION: LEFT;ANTERIOR

## 2024-06-06 NOTE — PLAN OF CARE
Pt adheres to fall precautions, remains free from injury.  Pt has purewick in place, voiding adequate amounts of urine.  Pt tolerating diet with no c/o nausea.      Problem: Discharge Planning  Goal: Discharge to home or other facility with appropriate resources  6/6/2024 0117 by Alyssa Dean RN  Outcome: Progressing     Problem: Pain  Goal: Verbalizes/displays adequate comfort level or baseline comfort level  6/6/2024 0117 by Alyssa Dean RN  Outcome: Progressing     Problem: Safety - Adult  Goal: Free from fall injury  6/6/2024 0117 by Alyssa Dean RN  Outcome: Progressing     Problem: ABCDS Injury Assessment  Goal: Absence of physical injury  6/6/2024 0117 by Alyssa Dean RN  Outcome: Progressing     Problem: Chronic Conditions and Co-morbidities  Goal: Patient's chronic conditions and co-morbidity symptoms are monitored and maintained or improved  6/6/2024 0117 by Alyssa Dean RN  Outcome: Progressing     Problem: Neurosensory - Adult  Goal: Achieves stable or improved neurological status  6/6/2024 0117 by Alyssa Dean RN  Outcome: Progressing     Problem: Respiratory - Adult  Goal: Able to breathe comfortably  6/5/2024 1934 by Jake Wick RCP  Outcome: Progressing     Problem: Cardiovascular - Adult  Goal: Maintains optimal cardiac output and hemodynamic stability  6/6/2024 0117 by Alyssa Dean RN  Outcome: Progressing     Problem: Skin/Tissue Integrity - Adult  Goal: Skin integrity remains intact  6/6/2024 0117 by Alyssa Dean RN  Outcome: Progressing  Flowsheets (Taken 6/5/2024 1425 by Vickie James RN)  Skin Integrity Remains Intact: Monitor for areas of redness and/or skin breakdown     Problem: Musculoskeletal - Adult  Goal: Return mobility to safest level of function  6/6/2024 0117 by Alyssa Dean RN  Outcome: Progressing     Problem: Musculoskeletal - Adult  Goal: Maintain proper alignment of affected body part  6/6/2024 0117 by Alyssa Dean RN  Outcome: Progressing     Problem:  Gastrointestinal - Adult  Goal: Minimal or absence of nausea and vomiting  6/6/2024 0117 by Alyssa Dean RN  Outcome: Progressing     Problem: Genitourinary - Adult  Goal: Absence of urinary retention  6/6/2024 0117 by Alyssa Dean RN  Outcome: Progressing     Problem: Metabolic/Fluid and Electrolytes - Adult  Goal: Electrolytes maintained within normal limits  6/6/2024 0117 by Alyssa Dean RN  Outcome: Progressing     Problem: Skin/Tissue Integrity  Goal: Absence of new skin breakdown  Description: 1.  Monitor for areas of redness and/or skin breakdown  2.  Assess vascular access sites hourly  3.  Every 4-6 hours minimum:  Change oxygen saturation probe site  4.  Every 4-6 hours:  If on nasal continuous positive airway pressure, respiratory therapy assess nares and determine need for appliance change or resting period.  6/6/2024 0117 by Alyssa Dean RN  Outcome: Progressing

## 2024-06-06 NOTE — CARE COORDINATION
Social Work- Patient is requesting rehab at [facility] Referral was sent. [facility] approved patient for admission. Will begin auth.

## 2024-06-06 NOTE — PROGRESS NOTES
Physician Progress Note      PATIENT:               RENATA CASTELLON  CSN #:                  [id number]  :                       1940  ADMIT DATE:       2024 12:14 AM  DISCH DATE:  RESPONDING  PROVIDER #:        VIRAL Coy CNP          QUERY TEXT:    Pt admitted with left tibial plateau fracture s/p fall.  Pt noted to have   creatinine (6/4) 0.7, (6/5) 1.0.  If possible, please document in the progress   notes and discharge summary if you are evaluating and/or treating any of the   following:    The medical record reflects the following:  Risk Factors: left tibial plateau fracture s/p fall, DM 2, CKD, HTN, HFrEF,   age 83  Clinical Indicators: creatinine (/4) 0.7, (6/5) 1.0  Treatment: IVF, labs    Defined by Kidney Disease Improving Global Outcomes (KDIGO) clinical practice   guideline for acute kidney injury:  -Increase in SCr by greater than or equal to 0.3 mg/dl within 48 hours; or  -Increase or decrease in SCr to greater than or equal to 1.5 times baseline,   which is known or presumed to have occurred within the prior 7 days; or  -Urine volume < 0.5ml/kg/h for 6 hours    Thank you!    Fiona Lockhart RN, BSN, RHIT, CCDS  Clinical   Options provided:  -- Acute kidney injury  -- Other - I will add my own diagnosis  -- Disagree - Not applicable / Not valid  -- Disagree - Clinically unable to determine / Unknown  -- Refer to Clinical Documentation Reviewer    PROVIDER RESPONSE TEXT:    This patient has acute kidney injury.    Query created by: Fiona Lockhart on 2024 2:56 PM      Electronically signed by:  VIRAL Coy CNP 2024 7:39 AM

## 2024-06-06 NOTE — PROGRESS NOTES
Physical Therapy  Facility/Department: [facility]  Physical Therapy Initial Assessment    Name: Sumaya Castillo  : 1940  MRN: [id number]  Date of Service: 2024    Discharge Recommendations:  Patient would benefit from continued therapy after discharge          Patient Diagnosis(es): The encounter diagnosis was Closed fracture of left tibial plateau, initial encounter.  Past Medical History:  has a past medical history of Asthma, COPD (chronic obstructive pulmonary disease) (HCC), Diabetes mellitus (HCC), History of blood transfusion, and Hypertension.  Past Surgical History:  has a past surgical history that includes Cardiac defibrillator placement; Abdomen surgery; and Carotid endarterectomy.    Assessment   Body Structures, Functions, Activity Limitations Requiring Skilled Therapeutic Intervention: Decreased functional mobility ;Decreased ADL status;Decreased ROM;Decreased strength;Decreased safe awareness;Decreased endurance;Decreased balance;Decreased posture  Assessment: Pt still with MAX deficts in bed mobility, transfers, ambulation, balance, posture, safety awareness and endurance this session,  & needs 2 assist for ALL functional mobility, and Ami Carmenza to SAFELY mobilize OOB and maintain NWB of LLE. Pt currently functioning below baseline with acute change of functional status due to medical diagnosis with AM-PAC mobility score of , and recommend DAILY inpatient skilled therapy at time of discharge to maximize long term outcomes, ENSURE SAFETY and prevent re-admission  Therapy Prognosis: Good  Decision Making: High Complexity  Exam: functional mobility, activity tolerance, Balance, & [facility] AM-[facility] 6 Clicks Basic Mobility  Clinical Presentation: evolving  Barriers to Learning: [place]  Requires PT Follow-Up: Yes  Activity Tolerance  Activity Tolerance: Patient limited by fatigue;Patient limited by pain;Patient limited by endurance (pt with increased lethargy/fatigue and c/o nausea)  NWB of LLE to facilitate pt independence for performance of ADL's & functional mobility, & reduce fall risk  Additional Goals?: Yes  Short Term Goal 6: Pt able to tolerate 30-40 min of activity to include 15-20 reps of ex, balance and endurance training,  & functional mobility with R/W to facilitate activity tolerance to WFL  Short Term Goal 7: Ed pt on home ex's, safety, balance & endurance training, proper pacing and breathing techniques, pressure relief with Pt able to demonstrate effective pressure relief techniques to reduce risk of skin breakdown,, fall prevention, & issue written Pt Ed  Patient Goals   Patient Goals : able to move independently & take care of myself       Education  Patient Education  Education Given To: Patient  Education Provided: Role of Therapy;Transfer Training;Equipment;Plan of Care;Energy Conservation;Fall Prevention Strategies  Education Provided Comments: Pt educated on importance of continued PT and mobility with progression, safety awareness, fall risk prevention, safe bed mobility and transfers w/ EVAN COLIN, NWB of LLE, circulation ex's, pressure relief and breathing techniques, prevention of sedentary complications, and PT POC.  Pt demonstrated FAIR carryover  Pt requires continued reinforcement of education.  Education Method: Demonstration;Verbal  Barriers to Learning: Hearing (decreased alertness)      Therapy Time   Individual Concurrent Group Co-treatment   Time In 1119     1040   Time Out 1125     1119   Minutes 6     39       Treatment time: 45 minutes  Co-treatment with OT warranted secondary to decreased safety and independence requiring 2 skilled therapy professionals to address individual discipline's goals. PT addressing core control in transitions with bed mobility & transfers, seated/standing posture, pressure relief, seated & standing postural alignment and breathing techniques, safety/scanning, & fall prevention in ambulation techniques.            ALICIA CALDWELL

## 2024-06-06 NOTE — PLAN OF CARE
Problem: Respiratory - Adult  Goal: Able to breathe comfortably  6/6/2024 1936 by Cristobal Corrigan RCP  Outcome: Progressing  6/6/2024 0726 by Kate Neri RCP  Outcome: Progressing     Problem: Respiratory - Adult  Goal: Patient's breath sounds will be clear and equal  6/6/2024 1936 by Cristobal Corrigan RCP  Outcome: Progressing  6/6/2024 0726 by Kate Neri, YANELIS  Outcome: Progressing     Problem: Respiratory - Adult  Goal: Achieves optimal ventilation and oxygenation  6/6/2024 1936 by Cristobal Corrigan, YANELIS  Outcome: Progressing  6/6/2024 0923 by Celsa Marie, RN  Outcome: Progressing  6/6/2024 0726 by Kate Neri, YANELIS  Outcome: Progressing

## 2024-06-06 NOTE — PLAN OF CARE
Problem: Respiratory - Adult  Goal: Able to breathe comfortably  6/6/2024 0726 by Kate Neri RCP  Outcome: Progressing  6/5/2024 1934 by Jake Wick RCP  Outcome: Progressing  Goal: Patient's breath sounds will be clear and equal  6/6/2024 0726 by Kate Neri RCP  Outcome: Progressing  6/5/2024 1934 by Jake Wick RCP  Outcome: Progressing  Goal: Achieves optimal ventilation and oxygenation  6/6/2024 0726 by Kate Neri RCP  Outcome: Progressing  6/6/2024 0117 by Alyssa Dean, RN  Outcome: Progressing  6/5/2024 1934 by Jake Wick RCP  Outcome: Progressing

## 2024-06-06 NOTE — CARE COORDINATION
Social Work-Submitted auth through [facility]. Auth received. Will schedule transport. Alessia

## 2024-06-06 NOTE — PROGRESS NOTES
Occupational Therapy  Facility/Department: [facility] [place]  Daily Treatment Note  NAME: Sumaya Castillo  : 1940  MRN: [id number]    IMELDA CLAIRE reports patient is medically stable for therapy treatment this date.    Chart reviewed prior to treatment and patient is agreeable for therapy.  All lines intact and patient positioned comfortably at end of treatment.  All patient needs addressed prior to ending therapy session.        Pt currently functioning below baseline.  Recommend daily inpatient skilled therapy at time of discharge to maximize long term outcomes and prevent re-admission. Please refer to AM-PAC score for current level of function.     Date of Service: 2024    Discharge Recommendations:  Patient would benefit from continued therapy after discharge  OT Equipment Recommendations  Equipment Needed:  (CTA)      Patient Diagnosis(es): The encounter diagnosis was Closed fracture of left tibial plateau, initial encounter.     Assessment    Assessment: Pt with more lethargy/fatigue this date than at previous eval and cues needed to maintain arousal and open B eyes throughout.  RN was informed.  Self care tasks completed with pt this date and 2 staff assist needed for safety/balance support for all LB care/DEP.  Pt is DEP for ADL transfers with use of susie stedy.  Pt's increased lethargy, nausea, pain issues and cognitive deficits are a barrier in tx.  Continue as able with OT POC and focus on maximizing I/safety with ADL performance.  Activity Tolerance: Patient limited by fatigue;Patient limited by pain (pt with increased lethargy/fatigue and c/o nausea)  Discharge Recommendations: Patient would benefit from continued therapy after discharge  Equipment Needed:  (CTA)      Plan   Occupational Therapy Plan  Times Per Week: 5x/week 1x/day as keny  Current Treatment Recommendations: Strengthening;Balance training;Functional mobility training;Safety education & training;Endurance training;Pain  all  Sequencing: Requires cues for all        Objective         Bed Mobility Training  Bed Mobility Training: Yes  Overall Level of Assistance: Maximum assistance;Assist X2;Additional time  Interventions: Verbal cues;Safety awareness training;Tactile cues (MAX cues/tactile assist for hand placement on bed rail to assist, pacing, pursed lip breathing, bending RLE up in bed to assist with rolling, assist of LLE, proper lo\g rolling tech, use of BUE's to assist with upright position/full scoot to EOB for safe sitting)  Rolling: Maximum assistance;Assist X2;Additional time  Supine to Sit: Maximum assistance;Assist X2;Additional time  Sit to Supine:  (N/T and pt was agreeable to sitting up in recliner after edu on the benefits of being up OOB as able; waffle cushion added to recliner)  Scooting: Maximum assistance;Assist X2;Additional time    Balance  Sitting:  (MIN to SBA)  Standing:  (MAX x2 for static stand with RW and in susie stedy)  Transfer Training  Transfer Training: Yes  Overall Level of Assistance: Total assistance;Maximum assistance;Assist X2 (DEP with use of susie stedy and MAX x2 bed to recliner)  Interventions: Verbal cues;Demonstration;Safety awareness training;Tactile cues (MAX cues/tactile assist and demo for B hand placement pushing from surface seated on/reaching back, pacing, upright posture/weight shifting, assist to maintain LLE NWB and importance of adherence, pursed lip breathing, B hand placement on susie stedy handle, nose over toes tech, upright posture/weight shifting, controlled stand to sits and awareness/assist with lines to increase overall safety)  Sit to Stand: Maximum assistance;Assist X2;Additional time (bed was elevated and with use of RW and susie stedy)  Stand to Sit: Maximum assistance;Assist X2;Additional time  Bed to Chair: Maximum assistance;Assist X2;Total assistance  Toilet Transfer:  (N/T and pt ext cath now)     ADL  Grooming: Setup;Minimal assistance  Grooming Skilled Clinical

## 2024-06-06 NOTE — DISCHARGE INSTR - COC
Continuity of Care Form    Patient Name: Sumaya Castillo   :  1940  MRN:  [id number]    Admit date:  2024  Discharge date:  2026    Code Status Order: Full Code   Advance Directives:     Admitting Physician:  Nicholas Mcarthur MD  PCP: Marlen García MD    Discharging Nurse: Celsa Mensah Hospital Unit/Room#:   Discharging Unit Phone Number: [phone]    Emergency Contact:   Extended Emergency Contact Information  Primary Emergency Contact: Jennifer Ferris  Home Phone: [phone]  Relation: Child  Secondary Emergency Contact: Ellen Crespo   [facility]  Home Phone: [phone]  Relation: Child    Past Surgical History:  Past Surgical History:   Procedure Laterality Date    ABDOMEN SURGERY      CARDIAC DEFIBRILLATOR PLACEMENT      VIVA XTCRT-D  Defibrillator    CAROTID ENDARTERECTOMY         Immunization History:   Immunization History   Administered Date(s) Administered    COVID-19, PFIZER PURPLE top, DILUTE for use, (age 12 y+), 30mcg/0.3mL 2021, 2021, 2021, 2022       Active Problems:  Patient Active Problem List   Diagnosis Code    Acquired arteriovenous malformation (HCC) I77.89    Anemia D64.9    Arteriosclerosis of coronary artery I25.10    Chronic kidney disease N18.9    COPD (chronic obstructive pulmonary disease) (HCC) J44.9    Essential hypertension I10    Gastroesophageal reflux disease K21.9    Heart failure with reduced ejection fraction ([facility]) I50.20    History of CEA (carotid endarterectomy) Z98.890    Implantable cardioverter-defibrillator (ICD) in situ Z95.810    Iron deficiency anemia due to chronic blood loss D50.0    Mild cognitive disorder F09    Nonischemic cardiomyopathy ([facility]) I42.8    Obstructive sleep apnea syndrome G47.33    RLS (restless legs syndrome) G25.81    Subclinical hyperthyroidism E05.90    Type 2 diabetes mellitus without complication, with long-term current use of insulin (HCC) E11.9, Z79.4    Closed  fracture of left tibial plateau S82.142A    Elevated creatine kinase level R74.8       Isolation/Infection:   Isolation            No Isolation          Patient Infection Status       None to display                     Nurse Assessment:  Last Vital Signs: BP (!) 146/51   Pulse 71   Temp 97.9 °F (36.6 °C) (Oral)   Resp 16   Ht 1.549 m (5' 1\")   Wt 70.3 kg (155 lb)   SpO2 94%   BMI 29.29 kg/m²     Last documented pain score (0-10 scale): Pain Level: 3  Last Weight:   Wt Readings from Last 1 Encounters:   06/07/24 70.3 kg (155 lb)     Mental Status:  oriented, alert, coherent, logical, thought processes intact, and able to concentrate and follow conversation    IV Access:  - None    Nursing Mobility/ADLs:  Walking   Dependent  Transfer  Dependent  Bathing  Assisted  Dressing  Assisted  Toileting  Assisted  Feeding  Independent  Med Admin  Independent  Med Delivery   whole    Wound Care Documentation and Therapy:        Elimination:  Continence:   Bowel: Yes  Bladder: Yes  Urinary Catheter: None   Colostomy/Ileostomy/Ileal Conduit: No       Date of Last BM: 05/31/2024    Intake/Output Summary (Last 24 hours) at 6/7/2024 1436  Last data filed at 6/7/2024 0517  Gross per 24 hour   Intake 1940.28 ml   Output 800 ml   Net 1140.28 ml     I/O last 3 completed shifts:  In: 2612.4 [P.O.:1220; I.V.:1392.4]  Out: 1950 [Urine:1950]    Safety Concerns:     History of Falls (last 30 days) and At Risk for Falls    Impairments/Disabilities:      Vision and Hearing    Nutrition Therapy:  Current Nutrition Therapy:   - Oral Diet:  General and Carb Control 4 carbs/meal (1800kcals/day)    Routes of Feeding: Oral  Liquids: Thin Liquids  Daily Fluid Restriction: no  Last Modified Barium Swallow with Video (Video Swallowing Test): not done    Treatments at the Time of Hospital Discharge:   Respiratory Treatments: n/a  Oxygen Therapy:  is not on home oxygen therapy.  Ventilator:    - No ventilator support    Rehab Therapies: Physical

## 2024-06-06 NOTE — PLAN OF CARE
Problem: Discharge Planning  Goal: Discharge to home or other facility with appropriate resources  6/6/2024 0923 by Celsa Marie RN  Outcome: Progressing  Flowsheets (Taken 6/6/2024 0818)  Discharge to home or other facility with appropriate resources:   Identify barriers to discharge with patient and caregiver   Arrange for needed discharge resources and transportation as appropriate   Identify discharge learning needs (meds, wound care, etc)   Refer to discharge planning if patient needs post-hospital services based on physician order or complex needs related to functional status, cognitive ability or social support system    Problem: Pain  Goal: Verbalizes/displays adequate comfort level or baseline comfort level  6/6/2024 0923 by Celsa Marie RN  Outcome: Progressing       Problem: Safety - Adult  Goal: Free from fall injury  6/6/2024 0923 by Celsa Marie RN  Outcome: Progressing    Problem: ABCDS Injury Assessment  Goal: Absence of physical injury  6/6/2024 0923 by Celsa Marie RN  Outcome: Progressing       Problem: Chronic Conditions and Co-morbidities  Goal: Patient's chronic conditions and co-morbidity symptoms are monitored and maintained or improved  6/6/2024 0923 by Celsa Marie RN  Outcome: Progressing  Flowsheets (Taken 6/6/2024 0818)  Care Plan - Patient's Chronic Conditions and Co-Morbidity Symptoms are Monitored and Maintained or Improved:   Monitor and assess patient's chronic conditions and comorbid symptoms for stability, deterioration, or improvement   Collaborate with multidisciplinary team to address chronic and comorbid conditions and prevent exacerbation or deterioration   Update acute care plan with appropriate goals if chronic or comorbid symptoms are exacerbated and prevent overall improvement and discharge       Problem: Neurosensory - Adult  Goal: Achieves stable or improved neurological status  6/6/2024 0923 by Celsa Marie RN  Outcome: Progressing  Flowsheets

## 2024-06-06 NOTE — PROGRESS NOTES
[facility]  Office: [phone]  Pj Oden DO, Jesús Ocampo DO, Emerson Lucero DO, Dom Brian, DO, Zuleyka Curry MD, Estella Fountain MD, Nicholas Mcarthur MD, Trudy Morelos MD,  Osman Boggs MD, Romain Ballesteros MD, Lamberto Patterson MD,  Anand Loco DO, Jennifer Dotson MD, Chava Phillips MD, David Oden DO, Jocelyn Lezama MD,  Blaise Philip DO, Uzma Miller MD, Josephine Fairchild MD, Jennifer Barr MD, Lazaro Goldstein MD,  Abhi Lua MD, Víctor Cohn MD, Shanell Seth MD, Ambreen Chester MD, Atilio Patino MD, Sanjay Rincon MD, Chris Clarke DO, Bob Valverde DO, Ashley Porras MD,  Jairon Blair MD, Shirley Waterhouse, CNP,  Loren Lobo CNP, Jam Fernandes, CNP,  Fiona Thompson, DNP, Radha Varghese, CNP, Sujatha Garza, CNP, Gilda Durhma, CNP, Estephanie Santoyo, CNP, Astrid Jade, PA-C, Rani Handy, PA-C, Stephanie Orona, CNP, Malorie Montoya, CNP, Laila Abdi, CNP, Carmen Ayala, CNP, Kayleen Khan, CNP, Génesis Velasquez, CNS, Jenna Trotter, CNP, Brandy Jay CNP, Tracy Schwab, CNP         [facility]   IN-PATIENT SERVICE   [facility]    Progress Note    6/6/2024    2:35 PM    Name:   Sumaya Castillo  MRN:     [id number]     Acct:      [id number]   Room:   2104/2104-01   Day:  2  Admit Date:  6/4/2024 12:14 AM    PCP:   Marlen García MD  Code Status:  Full Code    Subjective:     C/C:   Chief Complaint   Patient presents with    Fall    Knee Pain     Interval History Status: not changed.     Patient appears more alert today and reports continued pain that is more tolerable.  Patient has nausea; however, reports that she has not asked for as needed medications.  Patient is reeducated.  Patient states she feels more short of breath than baseline; however, breathing has improved from yesterday.  Patient is tolerating food and drink well with adequate urine output.  Patient is working with PT OT.  Patient is encouraged to stay out of  predominately involving the lateral tibial plateau, with fracture planes extending to the tibial spines, and to the posterior proximal diaphysis. 2. Moderate lipohemarthrosis. 3. Moderate to severe tricompartmental degenerative changes, with complete joint space loss of the patellofemoral compartment laterally. 4. Chondrocalcinosis.       Physical Examination:        Physical Exam    Physical Exam  Vitals and nursing note reviewed.   Constitutional:       Appearance: Normal appearance.   HENT:      Mouth/Throat:      Mouth: Mucous membranes are dry.   Eyes:      Conjunctiva/sclera: Conjunctivae normal.   Cardiovascular:      Rate and Rhythm: Normal rate and regular rhythm.      Pulses: Normal pulses.      Heart sounds: Normal heart sounds.   Pulmonary:      Effort: Pulmonary effort is normal.      Breath sounds: Normal breath sounds.   Abdominal:      General: Bowel sounds are normal.      Palpations: Abdomen is soft.   Musculoskeletal:         General: Swelling and tenderness present.      Left knee: Decreased range of motion.      Comments: Immobilizer in place    Skin:     General: Skin is warm and dry.      Capillary Refill: Capillary refill takes less than 2 seconds.   Neurological:      Mental Status: She is alert and oriented to person, place, and time.   LLE NV: warm, positive DP and PT pulses, cap refill <2 seconds, and sensation; negative numbness  Assessment:        Hospital Problems             Last Modified POA    * (Principal) Closed fracture of left tibial plateau 6/4/2024 Yes    Chronic kidney disease 6/4/2024 Yes    COPD (chronic obstructive pulmonary disease) ([facility]) 6/4/2024 Yes    Essential hypertension 6/4/2024 Yes    Heart failure with reduced ejection fraction ([facility]) 6/4/2024 Yes    Mild cognitive disorder 6/4/2024 Yes    Obstructive sleep apnea syndrome 6/4/2024 Yes    Subclinical hyperthyroidism 6/4/2024 Yes    Type 2 diabetes mellitus without complication, with long-term current use of insulin

## 2024-06-07 VITALS
RESPIRATION RATE: 16 BRPM | HEART RATE: 80 BPM | TEMPERATURE: 98.1 F | HEIGHT: 61 IN | DIASTOLIC BLOOD PRESSURE: 47 MMHG | WEIGHT: 155 LBS | OXYGEN SATURATION: 92 % | BODY MASS INDEX: 29.27 KG/M2 | SYSTOLIC BLOOD PRESSURE: 161 MMHG

## 2024-06-07 LAB
ANION GAP SERPL CALCULATED.3IONS-SCNC: 8 MMOL/L (ref 9–17)
BUN SERPL-MCNC: 26 MG/DL (ref 8–23)
BUN/CREAT SERPL: 24 (ref 9–20)
CALCIUM SERPL-MCNC: 7.6 MG/DL (ref 8.6–10.4)
CHLORIDE SERPL-SCNC: 109 MMOL/L (ref 98–107)
CO2 SERPL-SCNC: 23 MMOL/L (ref 20–31)
CREAT SERPL-MCNC: 1.1 MG/DL (ref 0.5–0.9)
ERYTHROCYTE [DISTWIDTH] IN BLOOD BY AUTOMATED COUNT: 15.2 % (ref 11.8–14.4)
GFR, ESTIMATED: 50 ML/MIN/1.73M2
GLUCOSE BLD-MCNC: 145 MG/DL (ref 65–105)
GLUCOSE BLD-MCNC: 168 MG/DL (ref 65–105)
GLUCOSE SERPL-MCNC: 157 MG/DL (ref 70–99)
HCT VFR BLD AUTO: 28.8 % (ref 36.3–47.1)
HGB BLD-MCNC: 8.4 G/DL (ref 11.9–15.1)
MCH RBC QN AUTO: 25.8 PG (ref 25.2–33.5)
MCHC RBC AUTO-ENTMCNC: 29.2 G/DL (ref 28.4–34.8)
MCV RBC AUTO: 88.3 FL (ref 82.6–102.9)
NRBC BLD-RTO: 0 PER 100 WBC
PLATELET # BLD AUTO: 203 K/UL (ref 138–453)
PMV BLD AUTO: 10.7 FL (ref 8.1–13.5)
POTASSIUM SERPL-SCNC: 5 MMOL/L (ref 3.7–5.3)
RBC # BLD AUTO: 3.26 M/UL (ref 3.95–5.11)
SODIUM SERPL-SCNC: 140 MMOL/L (ref 135–144)
WBC OTHER # BLD: 7.1 K/UL (ref 3.5–11.3)

## 2024-06-07 PROCEDURE — 97110 THERAPEUTIC EXERCISES: CPT

## 2024-06-07 PROCEDURE — 97530 THERAPEUTIC ACTIVITIES: CPT

## 2024-06-07 PROCEDURE — 2700000000 HC OXYGEN THERAPY PER DAY

## 2024-06-07 PROCEDURE — 6360000002 HC RX W HCPCS: Performed by: NURSE PRACTITIONER

## 2024-06-07 PROCEDURE — 36415 COLL VENOUS BLD VENIPUNCTURE: CPT

## 2024-06-07 PROCEDURE — 94761 N-INVAS EAR/PLS OXIMETRY MLT: CPT

## 2024-06-07 PROCEDURE — 85027 COMPLETE CBC AUTOMATED: CPT

## 2024-06-07 PROCEDURE — 97535 SELF CARE MNGMENT TRAINING: CPT

## 2024-06-07 PROCEDURE — 6370000000 HC RX 637 (ALT 250 FOR IP)

## 2024-06-07 PROCEDURE — 80048 BASIC METABOLIC PNL TOTAL CA: CPT

## 2024-06-07 PROCEDURE — 82947 ASSAY GLUCOSE BLOOD QUANT: CPT

## 2024-06-07 PROCEDURE — 99239 HOSP IP/OBS DSCHRG MGMT >30: CPT | Performed by: NURSE PRACTITIONER

## 2024-06-07 PROCEDURE — 6370000000 HC RX 637 (ALT 250 FOR IP): Performed by: NURSE PRACTITIONER

## 2024-06-07 PROCEDURE — 94640 AIRWAY INHALATION TREATMENT: CPT

## 2024-06-07 PROCEDURE — 6370000000 HC RX 637 (ALT 250 FOR IP): Performed by: FAMILY MEDICINE

## 2024-06-07 RX ORDER — BISACODYL 10 MG
10 SUPPOSITORY, RECTAL RECTAL ONCE
Qty: 1 SUPPOSITORY | Refills: 0 | DISCHARGE
Start: 2024-06-07 | End: 2024-06-07

## 2024-06-07 RX ORDER — POLYETHYLENE GLYCOL 3350 17 G/17G
17 POWDER, FOR SOLUTION ORAL DAILY PRN
Qty: 527 G | Refills: 1 | DISCHARGE
Start: 2024-06-07 | End: 2024-07-07

## 2024-06-07 RX ORDER — BISACODYL 10 MG
10 SUPPOSITORY, RECTAL RECTAL ONCE
Status: COMPLETED | OUTPATIENT
Start: 2024-06-07 | End: 2024-06-07

## 2024-06-07 RX ORDER — OXYCODONE HYDROCHLORIDE AND ACETAMINOPHEN 5; 325 MG/1; MG/1
1 TABLET ORAL EVERY 6 HOURS PRN
Qty: 12 TABLET | Refills: 0 | Status: SHIPPED | OUTPATIENT
Start: 2024-06-07 | End: 2024-06-10

## 2024-06-07 RX ORDER — LISINOPRIL 40 MG/1
40 TABLET ORAL DAILY
Qty: 30 TABLET | Refills: 3 | Status: SHIPPED | OUTPATIENT
Start: 2024-06-12

## 2024-06-07 RX ORDER — LABETALOL HYDROCHLORIDE 5 MG/ML
20 INJECTION, SOLUTION INTRAVENOUS ONCE
Status: COMPLETED | OUTPATIENT
Start: 2024-06-07 | End: 2024-06-07

## 2024-06-07 RX ORDER — SENNA AND DOCUSATE SODIUM 50; 8.6 MG/1; MG/1
2 TABLET, FILM COATED ORAL DAILY PRN
DISCHARGE
Start: 2024-06-07

## 2024-06-07 RX ADMIN — BUDESONIDE AND FORMOTEROL FUMARATE DIHYDRATE 2 PUFF: 160; 4.5 AEROSOL RESPIRATORY (INHALATION) at 07:34

## 2024-06-07 RX ADMIN — OXYCODONE HYDROCHLORIDE AND ACETAMINOPHEN 2 TABLET: 5; 325 TABLET ORAL at 13:43

## 2024-06-07 RX ADMIN — BISACODYL 10 MG: 10 SUPPOSITORY RECTAL at 11:11

## 2024-06-07 RX ADMIN — IPRATROPIUM BROMIDE AND ALBUTEROL SULFATE 1 DOSE: .5; 2.5 SOLUTION RESPIRATORY (INHALATION) at 14:37

## 2024-06-07 RX ADMIN — PANTOPRAZOLE SODIUM 40 MG: 40 TABLET, DELAYED RELEASE ORAL at 05:22

## 2024-06-07 RX ADMIN — AMLODIPINE BESYLATE 5 MG: 5 TABLET ORAL at 11:12

## 2024-06-07 RX ADMIN — HEPARIN SODIUM 5000 UNITS: 5000 INJECTION INTRAVENOUS; SUBCUTANEOUS at 05:22

## 2024-06-07 RX ADMIN — IPRATROPIUM BROMIDE AND ALBUTEROL SULFATE 1 DOSE: .5; 2.5 SOLUTION RESPIRATORY (INHALATION) at 11:45

## 2024-06-07 RX ADMIN — HEPARIN SODIUM 5000 UNITS: 5000 INJECTION INTRAVENOUS; SUBCUTANEOUS at 13:44

## 2024-06-07 RX ADMIN — OXYCODONE HYDROCHLORIDE AND ACETAMINOPHEN 2 TABLET: 5; 325 TABLET ORAL at 04:31

## 2024-06-07 RX ADMIN — POLYETHYLENE GLYCOL 3350 17 G: 17 POWDER, FOR SOLUTION ORAL at 11:11

## 2024-06-07 RX ADMIN — PREGABALIN 75 MG: 75 CAPSULE ORAL at 11:12

## 2024-06-07 RX ADMIN — Medication 1000 UNITS: at 11:11

## 2024-06-07 RX ADMIN — FERROUS SULFATE TAB EC 325 MG (65 MG FE EQUIVALENT) 325 MG: 325 (65 FE) TABLET DELAYED RESPONSE at 11:11

## 2024-06-07 RX ADMIN — METHIMAZOLE 5 MG: 5 TABLET ORAL at 11:11

## 2024-06-07 RX ADMIN — IPRATROPIUM BROMIDE AND ALBUTEROL SULFATE 1 DOSE: .5; 2.5 SOLUTION RESPIRATORY (INHALATION) at 07:33

## 2024-06-07 RX ADMIN — LABETALOL HYDROCHLORIDE 20 MG: 5 INJECTION INTRAVENOUS at 00:33

## 2024-06-07 ASSESSMENT — PAIN SCALES - GENERAL
PAINLEVEL_OUTOF10: 6
PAINLEVEL_OUTOF10: 3
PAINLEVEL_OUTOF10: 8
PAINLEVEL_OUTOF10: 1

## 2024-06-07 ASSESSMENT — PAIN DESCRIPTION - LOCATION
LOCATION: HIP
LOCATION: SHOULDER;LEG

## 2024-06-07 ASSESSMENT — PAIN DESCRIPTION - PAIN TYPE: TYPE: ACUTE PAIN

## 2024-06-07 ASSESSMENT — PAIN DESCRIPTION - ONSET: ONSET: ON-GOING

## 2024-06-07 ASSESSMENT — PAIN DESCRIPTION - DESCRIPTORS
DESCRIPTORS: SORE;STABBING
DESCRIPTORS: ACHING

## 2024-06-07 ASSESSMENT — PAIN DESCRIPTION - ORIENTATION: ORIENTATION: LEFT

## 2024-06-07 NOTE — DISCHARGE SUMMARY
[facility]  Office: [phone]  Pj Oden DO, Jesús Ocampo DO, Emerson Lucero DO, Dom Brian DO, Zuleyka Curry MD, Estella Fountain MD, Nicholas Mcarthur MD, Trudy Moerlos MD,  Osman Boggs MD, Romain Ballesteros MD, Lamberto Patterson MD,  Anand Loco DO, Jennifer Dotson MD, Chava Phillips MD, David Oden DO, Jocelyn Lezama MD,  Blaise Philip DO, Uzma Miller MD, Josephine Fairchild MD, Jennifer Barr MD, Lazaro Goldstein MD,  Abhi Lua MD, Víctor Cohn MD, Shanell Seth MD, Ambreen Chester MD, Atilio Patino MD, Sanjay Rincon MD, Chris Clarke DO, Bob Valverde DO, Ashley Porras MD,  Jairon Blair MD, Shirley Waterhouse, CNP,  Loren Lobo CNP, Jam Fernandes, CNP,  Fiona Thompson, TUCKER, Radha Varghese CNP, Sujatha Garza, CNP, Gilda Durham CNP, Estephanie Santoyo CNP, Astrid Jade, PA-C, Rani Handy PA-C, Stephanie Orona, CNP, Malorie Montoya, CNP, Laila Abdi CNP, Carmen Ayala CNP, Kayleen Khan CNP, Génesis Velasquez, CNS, Jenna Trotter, CNP, Brandy Jay CNP, Tracy Schwab, CNP         [facility]   IN-PATIENT SERVICE   [facility]    Discharge Summary     Patient ID: Sumaya Castillo  :  1940   MRN: [id number]     ACCOUNT:  [id number]   Patient's PCP: Marlen García MD  Admit Date: 2024   Discharge Date: 2024   Length of Stay: 3  Code Status:  Full Code  Admitting Physician: Nicholas Mcarthur MD  Discharge Physician: Loren M Graves, APRN - CNP     Active Discharge Diagnoses:     Hospital Problem Lists:  Principal Problem:    Closed fracture of left tibial plateau  Active Problems:    Chronic kidney disease    COPD (chronic obstructive pulmonary disease) ([facility])    Essential hypertension    Heart failure with reduced ejection fraction (HCC)    Mild cognitive disorder    Obstructive sleep apnea syndrome    Subclinical hyperthyroidism    Type 2 diabetes mellitus without complication, with long-term current use of insulin  Pain for up to 3 days. Max Daily Amount: 4 tablets     polyethylene glycol 17 g packet  Commonly known as: GLYCOLAX  Take 1 packet by mouth daily as needed for Constipation     sennosides-docusate sodium 8.6-50 MG tablet  Commonly known as: SENOKOT-S  Take 2 tablets by mouth daily as needed for Constipation            CHANGE how you take these medications      lisinopril 40 MG tablet  Commonly known as: PRINIVIL;ZESTRIL  Take 1 tablet by mouth daily Okay to resume after repeat labs next week that are due 6/11  Start taking on: June 12, 2024  What changed:   additional instructions  These instructions start on June 12, 2024. If you are unsure what to do until then, ask your doctor or other care provider.            CONTINUE taking these medications      albuterol sulfate  (90 Base) MCG/ACT inhaler  Commonly known as: PROVENTIL;VENTOLIN;PROAIR     alendronate 70 MG tablet  Commonly known as: FOSAMAX     amLODIPine 5 MG tablet  Commonly known as: NORVASC     ferrous sulfate 324 (65 Fe) MG EC tablet     fluticasone 50 MCG/ACT nasal spray  Commonly known as: FLONASE     GUAIFENESIN 1200 PO     insulin lispro (1 Unit Dial) 100 UNIT/ML Sopn  Commonly known as: HUMALOG/ADMELOG     ipratropium 0.5 mg-albuterol 2.5 mg 0.5-2.5 (3) MG/3ML Soln nebulizer solution  Commonly known as: DUONEB     ketoconazole 2 % shampoo  Commonly known as: NIZORAL     Lantus SoloStar 100 UNIT/ML injection pen  Generic drug: insulin glargine     lidocaine 5 %  Commonly known as: LIDODERM     loratadine 10 MG tablet  Commonly known as: CLARITIN     methIMAzole 5 MG tablet  Commonly known as: TAPAZOLE     pantoprazole 40 MG tablet  Commonly known as: PROTONIX     pregabalin 75 MG capsule  Commonly known as: LYRICA     rOPINIRole 1 MG tablet  Commonly known as: REQUIP     Trelegy Ellipta 200-62.5-25 MCG/ACT Aepb inhaler  Generic drug: fluticasone-umeclidin-vilant     Vitamin D (Cholecalciferol) 25 MCG (1000 UT) Tabs            STOP taking these

## 2024-06-07 NOTE — PLAN OF CARE
Problem: Respiratory - Adult  Goal: Able to breathe comfortably  6/7/2024 0737 by Megan Noel RCP  Outcome: Progressing  6/6/2024 1936 by Cristobal Corrigan RCP  Outcome: Progressing  Goal: Patient's breath sounds will be clear and equal  6/7/2024 0737 by Megan Noel RCP  Outcome: Progressing  6/6/2024 1936 by Cristobal Corrigan RCP  Outcome: Progressing  Goal: Achieves optimal ventilation and oxygenation  6/7/2024 0737 by Megan Noel RCP  Outcome: Progressing  6/7/2024 0236 by Abdifatah Franklin RN  Outcome: Progressing  6/6/2024 1936 by Cristobal Corrigan RCP  Outcome: Progressing

## 2024-06-07 NOTE — PROGRESS NOTES
Physical Therapy  Facility/Department: [facility] MED SURG  Daily Treatment Note  NAME: Sumaya Castillo  : 1940  MRN: [id number]    Date of Service: 2024    Discharge Recommendations:  Patient would benefit from continued therapy after discharge   Patient Diagnosis(es): The primary encounter diagnosis was Closed fracture of left tibial plateau, initial encounter. Diagnoses of Anemia, unspecified type, Iron deficiency anemia due to chronic blood loss, Elevated creatine kinase level, and Fracture were also pertinent to this visit.    Assessment   Assessment: Pt is functioning below baseline and would benefit from continued skilled therapy to maxmize return to PLOF. Pt required maximum assistance x 2 with use of susie stedy for transfers OOB this session. Will progress mobility as tolerated  Activity Tolerance: Patient limited by endurance;Patient limited by pain     Plan    Physical Therapy Plan  General Plan: 5-7 times per week  Current Treatment Recommendations: Strengthening;Balance training;Functional mobility training;Transfer training;ADL/Self-care training;Endurance training;Gait training;Stair training;Home exercise program;Safety education & training;Patient/Caregiver education & training;Therapeutic activities;Positioning;Pain management     Restrictions  Restrictions/Precautions  Restrictions/Precautions: Fall Risk, General Precautions, Weight Bearing  Required Braces or Orthoses?: Yes  Implants present? : Pacemaker (defibrillator)  Lower Extremity Weight Bearing Restrictions  Left Lower Extremity Weight Bearing: Non Weight Bearing  Required Braces or Orthoses  Left Lower Extremity Brace: Knee Immobilizer  Position Activity Restriction  Other position/activity restrictions: Up with assist, NWB with R knee immobilizer(can be removed every shift and for hygiene), R UE IV, ext urinary catheter, ALARMS     Subjective    Subjective  Subjective: Pt agreeable to PT session (Nurse gives approval for PT session)  and independence requiring 2 skilled therapy professionals to address individual discipline's goals. PT addressing   , pre gait trunk strengthening, weight shifting prior to transfers, transfer training, and postural control in sitting.   Charo Saab, PTA

## 2024-06-07 NOTE — PROGRESS NOTES
with reduced ejection fraction (HCC) 6/4/2024 Yes    Mild cognitive disorder 6/4/2024 Yes    Obstructive sleep apnea syndrome 6/4/2024 Yes    Subclinical hyperthyroidism 6/4/2024 Yes    Type 2 diabetes mellitus without complication, with long-term current use of insulin (HCC) 6/4/2024 Yes    Elevated creatine kinase level 6/5/2024 Yes       Plan:        Patient status inpatient in the Med/Surge     Tibial plateau fracture;left   -Per Ortho maintain immobilizer  -PT OT   -Pain and nausea meds PRN  -DVT prophylaxis  -D/C plan for rehab facility      CKD with slight bump in creatine likely due to decreased fluid intake  -encourage PO intake of fluids  -avoid nephrotoxic meds  -monitor I/O frequently  -repeat BMP in one week      Type 2 diabetes with long term insulin use   Lantus increased to home doses   Insulin sliding scale before meals and at bedtime while hospitalized     Nonischemic cardiomyopathy  Continue appropriate home medications  Continue to Hold Lasix for today due to WAYNE  Strictly Monitor I&O     COPD   Continue appropriate home medications  Continue breathing treatments  Encourage pulmonary toileting   Monitor for hypoxia     Essential hypertension   Continue appropriate home medications  Lisinopril currently on hold due to WAYNE  Monitor BP and pulse     AMADO  Okay for home CPAP     Subclinical hyperthyroidism   Continue home Tapazole    Jill Vaqsuez, APRN - CNP  6/7/2024  4:56 PM

## 2024-06-07 NOTE — PLAN OF CARE
Problem: Discharge Planning  Goal: Discharge to home or other facility with appropriate resources  Outcome: Progressing  Flowsheets (Taken 6/6/2024 2000)  Discharge to home or other facility with appropriate resources:   Identify barriers to discharge with patient and caregiver   Arrange for needed discharge resources and transportation as appropriate   Refer to discharge planning if patient needs post-hospital services based on physician order or complex needs related to functional status, cognitive ability or social support system   Identify discharge learning needs (meds, wound care, etc)     Problem: Chronic Conditions and Co-morbidities  Goal: Patient's chronic conditions and co-morbidity symptoms are monitored and maintained or improved  Outcome: Progressing  Flowsheets (Taken 6/6/2024 2000)  Care Plan - Patient's Chronic Conditions and Co-Morbidity Symptoms are Monitored and Maintained or Improved:   Monitor and assess patient's chronic conditions and comorbid symptoms for stability, deterioration, or improvement   Collaborate with multidisciplinary team to address chronic and comorbid conditions and prevent exacerbation or deterioration   Update acute care plan with appropriate goals if chronic or comorbid symptoms are exacerbated and prevent overall improvement and discharge

## 2024-06-07 NOTE — PLAN OF CARE
Problem: ABCDS Injury Assessment  Goal: Absence of physical injury  6/7/2024 0236 by Abdifatah Franklin RN  Outcome: Progressing     Problem: Chronic Conditions and Co-morbidities  Goal: Patient's chronic conditions and co-morbidity symptoms are monitored and maintained or improved  6/7/2024 0236 by Abdifatah Franklin RN  Outcome: Progressing  Flowsheets (Taken 6/6/2024 2000)  Care Plan - Patient's Chronic Conditions and Co-Morbidity Symptoms are Monitored and Maintained or Improved:   Monitor and assess patient's chronic conditions and comorbid symptoms for stability, deterioration, or improvement   Collaborate with multidisciplinary team to address chronic and comorbid conditions and prevent exacerbation or deterioration   Update acute care plan with appropriate goals if chronic or comorbid symptoms are exacerbated and prevent overall improvement and discharge     Problem: Respiratory - Adult  Goal: Able to breathe comfortably  6/7/2024 0737 by Megan Noel RCP  Outcome: Progressing  Goal: Patient's breath sounds will be clear and equal  6/7/2024 0737 by Megan Noel RCP  Outcome: Progressing     Problem: Skin/Tissue Integrity  Goal: Absence of new skin breakdown  Description: 1.  Monitor for areas of redness and/or skin breakdown  2.  Assess vascular access sites hourly  3.  Every 4-6 hours minimum:  Change oxygen saturation probe site  4.  Every 4-6 hours:  If on nasal continuous positive airway pressure, respiratory therapy assess nares and determine need for appliance change or resting period.  6/7/2024 0236 by Abdifatah Franklin RN  Outcome: Progressing     Problem: Discharge Planning  Goal: Discharge to home or other facility with appropriate resources  6/7/2024 1622 by Elham Dorado, RN  Outcome: Completed  6/7/2024 0236 by Abdifatah Franklin RN  Outcome: Progressing  Flowsheets (Taken 6/6/2024 2000)  Discharge to home or other facility with appropriate resources:   Identify barriers to discharge with  Completed  6/7/2024 0236 by Abdifatah Franklin RN  Outcome: Progressing     Problem: Gastrointestinal - Adult  Goal: Minimal or absence of nausea and vomiting  6/7/2024 1622 by Elham Dorado RN  Outcome: Completed  6/7/2024 0236 by Abdifatah Franklin RN  Outcome: Progressing     Problem: Genitourinary - Adult  Goal: Absence of urinary retention  6/7/2024 1622 by Elham Dorado RN  Outcome: Completed  6/7/2024 0236 by Abdifatah Franklin RN  Outcome: Progressing     Problem: Metabolic/Fluid and Electrolytes - Adult  Goal: Electrolytes maintained within normal limits  6/7/2024 1622 by Elham Dorado RN  Outcome: Completed  6/7/2024 0236 by Abdifatah Franklin RN  Outcome: Progressing  Flowsheets (Taken 6/6/2024 2000)  Electrolytes maintained within normal limits: Monitor labs and assess patient for signs and symptoms of electrolyte imbalances

## 2024-06-07 NOTE — PROGRESS NOTES
Writer attempted to call report to [facility].  No one answered, a message was left with my name, number and facility.

## 2024-06-07 NOTE — CARE COORDINATION
[facility] Orders faxed. Nurse to call report to [phone]. HENS completed. Patient is agreeable with dc plans. Bahman

## 2024-06-07 NOTE — PROGRESS NOTES
Occupational Therapy  Facility/Department: [facility] MED SURG  Rehabilitation Occupational Therapy Daily Treatment Note    Date: 24  Patient Name: Sumaya Castillo       Room:   MRN: [id number]  Account: [id number]   : 1940  (83 y.o.) Gender: female   Past Medical History:  has a past medical history of Asthma, COPD (chronic obstructive pulmonary disease) (HCC), Diabetes mellitus (HCC), History of blood transfusion, and Hypertension.  Past Surgical History:   has a past surgical history that includes Cardiac defibrillator placement; Abdomen surgery; and Carotid endarterectomy.    Restrictions  Restrictions/Precautions: Fall Risk, General Precautions, Weight Bearing  Implants present? : Pacemaker  Other position/activity restrictions: Up with assist, NWB with R knee immobilizer(can be removed every shift and for hygiene), R UE IV, ext urinary catheter, ALARMS  Left Lower Extremity Weight Bearing: Non Weight Bearing  Required Braces or Orthoses  Left Lower Extremity Brace: Knee Immobilizer  Required Braces or Orthoses?: Yes    Subjective  Subjective: Pt. resting in bed and agreeable for treatment.  Restrictions/Precautions: Fall Risk;General Precautions;Weight Bearing  Objective     Cognition  Overall Cognitive Status: Exceptions  Arousal/Alertness: Delayed responses to stimuli  Following Commands: Follows one step commands with increased time;Follows one step commands with repetition  Attention Span: Attends with cues to redirect;Difficulty attending to directions;Difficulty dividing attention  Memory: Decreased short term memory  Safety Judgement: Decreased awareness of need for safety;Decreased awareness of need for assistance  Problem Solving: Assistance required to correct errors made;Decreased awareness of errors;Assistance required to implement solutions;Assistance required to identify errors made  Insights: Decreased awareness of deficits  Initiation: Requires cues for all  Sequencing: Requires  management training;Positioning;Coordination training  Additional Comments: Cont with stated POC    Goals  Patient Goals   Patient goals : Pt states she needs therapy to move and walk better!  Short Term Goals  Time Frame for Short Term Goals: by discharge, pt to demo  Short Term Goal 1: bed mob tasks with use of rail as needed to mod assist of 1.  Short Term Goal 2: increase BUE strength by a 1/2 grade to assist with ADL's and be I with BUE HEP with use of handouts.  Short Term Goal 3: UB ADL to set up and LB ADL to max assist of 1 supine in bed/standing with AD/AE as needed.  Short Term Goal 4: ADL transfers with use of AD/lift as needed to max assist of 1 (add functional mob goal as appropriate).  Long Term Goals  Long Term Goal 1: Pt to complete toileting tasks with use of AD/BSC to mod assist of 1.  Long Term Goal 2: Pt to stand with mod assist of 1 and AD with 100% adherence to LLE NWB keny > 3 mins to reduce falls.  Long Term Goal 3: Pt/caregivers to be I with pressure relief, EC/WS and fall prevention tech, and DME/AE recommendations with use of handouts.    AM-PAC Score        AM-[facility] Inpatient Daily Activity Raw Score: 13 (06/07/24 1356)  AM-PAC Inpatient ADL T-Scale Score : 32.03 (06/07/24 1356)  ADL Inpatient CMS 0-100% Score: 63.03 (06/07/24 1356)  ADL Inpatient CMS G-Code Modifier : CL (06/07/24 [facility]6)      Therapy Time   Individual Concurrent Group Co-treatment   Time In       1050   Time Out       1120   Minutes       30       Co-treatment with PT warranted secondary to decreased safety and independence requiring 2 skilled therapy professionals to address individual discipline's goals. OT addressing preparation for ADL transfer, sitting balance for increased ADL performance, sitting/activity tolerance, functional reaching, environmental safety/scanning, fall prevention, functional mobility for ADL transfers, ability to sequence and follow directions, bed mobility tech, and functional UE strength.

## 2024-06-10 ENCOUNTER — HOSPITAL ENCOUNTER (OUTPATIENT)
Age: 84
Setting detail: SPECIMEN
Discharge: HOME | End: 2024-06-10

## 2024-06-10 LAB
ANION GAP SERPL CALCULATED.3IONS-SCNC: 15 MMOL/L (ref 9–17)
BUN SERPL-MCNC: 13 MG/DL (ref 8–23)
BUN/CREAT SERPL: 19 (ref 9–20)
CALCIUM SERPL-MCNC: 8.9 MG/DL (ref 8.6–10.4)
CHLORIDE SERPL-SCNC: 104 MMOL/L (ref 98–107)
CO2 SERPL-SCNC: 22 MMOL/L (ref 20–31)
CREAT SERPL-MCNC: 0.7 MG/DL (ref 0.5–0.9)
ERYTHROCYTE [DISTWIDTH] IN BLOOD BY AUTOMATED COUNT: 14.7 % (ref 11.8–14.4)
GFR, ESTIMATED: 85 ML/MIN/1.73M2
GLUCOSE SERPL-MCNC: 145 MG/DL (ref 70–99)
HCT VFR BLD AUTO: 31.1 % (ref 36.3–47.1)
HGB BLD-MCNC: 9.1 G/DL (ref 11.9–15.1)
MCH RBC QN AUTO: 25.7 PG (ref 25.2–33.5)
MCHC RBC AUTO-ENTMCNC: 29.3 G/DL (ref 28.4–34.8)
MCV RBC AUTO: 87.9 FL (ref 82.6–102.9)
NRBC BLD-RTO: 0 PER 100 WBC
PLATELET # BLD AUTO: 297 K/UL (ref 138–453)
PMV BLD AUTO: 10.9 FL (ref 8.1–13.5)
POTASSIUM SERPL-SCNC: 4 MMOL/L (ref 3.7–5.3)
RBC # BLD AUTO: 3.54 M/UL (ref 3.95–5.11)
SODIUM SERPL-SCNC: 141 MMOL/L (ref 135–144)
WBC OTHER # BLD: 5.5 K/UL (ref 3.5–11.3)

## 2024-06-10 PROCEDURE — 80048 BASIC METABOLIC PNL TOTAL CA: CPT

## 2024-06-10 PROCEDURE — 85027 COMPLETE CBC AUTOMATED: CPT

## 2024-06-10 PROCEDURE — P9603 ONE-WAY ALLOW PRORATED MILES: HCPCS

## 2024-06-10 PROCEDURE — 36415 COLL VENOUS BLD VENIPUNCTURE: CPT

## 2024-06-20 ENCOUNTER — OFFICE VISIT (OUTPATIENT)
Dept: ORTHOPEDIC SURGERY | Age: 84
End: 2024-06-20
Payer: MEDICARE

## 2024-06-20 VITALS — BODY MASS INDEX: 29.27 KG/M2 | WEIGHT: 155 LBS | OXYGEN SATURATION: 99 % | RESPIRATION RATE: 15 BRPM | HEIGHT: 61 IN

## 2024-06-20 DIAGNOSIS — S82.142D CLOSED FRACTURE OF LEFT TIBIAL PLATEAU WITH ROUTINE HEALING, SUBSEQUENT ENCOUNTER: Primary | ICD-10-CM

## 2024-06-20 PROCEDURE — 1123F ACP DISCUSS/DSCN MKR DOCD: CPT | Performed by: ORTHOPAEDIC SURGERY

## 2024-06-20 PROCEDURE — 99214 OFFICE O/P EST MOD 30 MIN: CPT | Performed by: ORTHOPAEDIC SURGERY

## 2024-06-20 RX ORDER — LIDOCAINE 4 G/G
PATCH TOPICAL
Qty: 14 PATCH | Refills: 0 | Status: SHIPPED | OUTPATIENT
Start: 2024-06-20

## 2024-06-20 NOTE — PROGRESS NOTES
below at today's visit.     The patient will avoid pain provoking activity. The patient will remain nonweightbearing.  She will continue to use her short knee immobilizer for comfort, and we discussed that she will remove the brace often and does not need to sleep in the brace.  At today's visit, she was also ordered topical lidocaine patches.      All questions were answered and the above plan was agreed upon. The patient will return to clinic in 1 month with left knee x-rays (nonweightbearing 3 views-AP, oblique, lateral).  At her next visit, we may consider progressing her activity with the help of physical therapy, and possibly considering 50% partial weightbearing.         At the patient's next visit, depending on how the patient is doing and/or new imaging/labs results, we may consider the following options:    []  Orthotic (OTC)     []  Orthotic (custom)          []  Rocker bottom shoes     []  Brace (OTC)        []  Brace (custom)             []  CAM boot        []  Night splint         []  Heel cups        []  Strap      []  Toe sleeves/splints    []  PT:                     []  Wean out of immobilization   []  Advance activity       []  Topical               []  NSAIDs          []  Val         []  Referral:         []  Stress xrays       []  CT         []  MRI        []  Injection:         []  Consider OR      []  Pick OR date    No follow-ups on file.    No orders of the defined types were placed in this encounter.    No orders of the defined types were placed in this encounter.        Emmanuel Dietrich MD  Orthopedic Surgery        Please excuse any typos/errors, as this note was created with the assistance of voice recognition software. While intending to generate a document that actually reflects the content of the visit, the document can still have some errors including those of syntax and sound-a-like substitutions which may escape proof reading. In such instances, actual meaning can be extrapolated by

## 2024-07-17 DIAGNOSIS — S82.142D CLOSED FRACTURE OF LEFT TIBIAL PLATEAU WITH ROUTINE HEALING, SUBSEQUENT ENCOUNTER: Primary | ICD-10-CM

## 2024-07-18 ENCOUNTER — OFFICE VISIT (OUTPATIENT)
Dept: ORTHOPEDIC SURGERY | Age: 84
End: 2024-07-18
Payer: MEDICARE

## 2024-07-18 VITALS — RESPIRATION RATE: 17 BRPM | WEIGHT: 155 LBS | BODY MASS INDEX: 29.27 KG/M2 | HEIGHT: 61 IN | OXYGEN SATURATION: 97 %

## 2024-07-18 DIAGNOSIS — S82.142D CLOSED FRACTURE OF LEFT TIBIAL PLATEAU WITH ROUTINE HEALING, SUBSEQUENT ENCOUNTER: Primary | ICD-10-CM

## 2024-07-18 PROCEDURE — 99212 OFFICE O/P EST SF 10 MIN: CPT | Performed by: ORTHOPAEDIC SURGERY

## 2024-07-18 PROCEDURE — 1123F ACP DISCUSS/DSCN MKR DOCD: CPT | Performed by: ORTHOPAEDIC SURGERY

## 2024-07-18 RX ORDER — OXYCODONE HYDROCHLORIDE AND ACETAMINOPHEN 5; 325 MG/1; MG/1
2 TABLET ORAL EVERY 8 HOURS PRN
COMMUNITY

## 2024-07-18 NOTE — PROGRESS NOTES
throughout  Motion:  Grossly able to fire major muscle groups with appropriate/expected AROM  Tenderness to Palpation:  knee anterolaterally-improved  -Extensor mechanism intact   -No significant knee swelling      RADIOLOGY:   7/18/2024 FINDINGS:  3 views (AP, oblique, and lateral) of the left knee were obtained in the office today and reviewed, revealing proximal tibial plateau fracture laterally.  Interval healing noted with questionable subtle interval displacement.    IMPRESSION: Osseous injury as above.    Electronically signed by Yusuf Dietrich MD      Relevant previous imaging reviewed, both imaging and report(s) as below:    CT KNEE LEFT WO CONTRAST  Result Date: 6/4/2024  1. Acute, mildly comminuted, nondisplaced intra-articular fracture of the proximal tibia, predominately involving the lateral tibial plateau, with fracture planes extending to the tibial spines, and to the posterior proximal diaphysis. 2. Moderate lipohemarthrosis. 3. Moderate to severe tricompartmental degenerative changes, with complete joint space loss of the patellofemoral compartment laterally. 4. Chondrocalcinosis.      XR KNEE LEFT (3 VIEWS)  Result Date: 6/4/2024  1. Suspected nondisplaced proximal tibial fracture with lucency extending to the metadiaphysis, only appreciated on a single view.  Dedicated CT is recommended for confirmation. 2. Small knee joint effusion. 3. Moderate tricompartmental knee osteoarthritis.        LABS:   Lab Results   Component Value Date    LABA1C 11.0 (H) 02/22/2013     Lab Results   Component Value Date     02/22/2013         ASSESSMENT AND PLAN:  Body mass index is 29.29 kg/m².  Assessment & Plan     She has a history of a left tibial plateau fracture, sustained on 6/3/2024.    -At her visit on 6/20/2024, she appears to be healing appropriately at this time.  We discussed her restrictions and the risks of displacement and possible future surgery.   -At her visit on 7/18/2024, we reviewed  none

## 2024-07-30 ENCOUNTER — HOSPITAL ENCOUNTER (OUTPATIENT)
Facility: CLINIC | Age: 84
Setting detail: SPECIMEN
Discharge: HOME | End: 2024-07-30
Payer: MEDICARE

## 2024-07-30 LAB
EST. AVERAGE GLUCOSE BLD GHB EST-MCNC: 186 MG/DL
HBA1C MFR BLD: 8.1 % (ref 4–6)

## 2024-07-30 PROCEDURE — 83036 HEMOGLOBIN GLYCOSYLATED A1C: CPT

## 2024-09-09 ENCOUNTER — OFFICE VISIT (OUTPATIENT)
Dept: ORTHOPEDIC SURGERY | Age: 84
End: 2024-09-09
Payer: MEDICARE

## 2024-09-09 VITALS — HEIGHT: 61 IN | RESPIRATION RATE: 14 BRPM | WEIGHT: 143 LBS | BODY MASS INDEX: 27 KG/M2

## 2024-09-09 DIAGNOSIS — M25.572 LEFT ANKLE PAIN, UNSPECIFIED CHRONICITY: ICD-10-CM

## 2024-09-09 DIAGNOSIS — S82.142D CLOSED FRACTURE OF LEFT TIBIAL PLATEAU WITH ROUTINE HEALING, SUBSEQUENT ENCOUNTER: Primary | ICD-10-CM

## 2024-09-09 PROCEDURE — 99213 OFFICE O/P EST LOW 20 MIN: CPT | Performed by: STUDENT IN AN ORGANIZED HEALTH CARE EDUCATION/TRAINING PROGRAM

## 2024-09-09 PROCEDURE — 1123F ACP DISCUSS/DSCN MKR DOCD: CPT | Performed by: STUDENT IN AN ORGANIZED HEALTH CARE EDUCATION/TRAINING PROGRAM

## 2024-10-21 NOTE — ED NOTES
Pt presents to ED via ems, a&o x4. Pt states earlier today she was washing her car at the car wash and lost her balance and fell hitting her L knee. Pt reports no other injuries. Swelling noted to the knee. Pt states she took aspirin around 1900 but pain is getting worse. Pulses and sensation intact. Limited ROM per pt.    none/cognitive limitations/physical limitations

## 2025-06-05 ENCOUNTER — HOSPITAL ENCOUNTER (OUTPATIENT)
Facility: CLINIC | Age: 85
Setting detail: SPECIMEN
Discharge: HOME | End: 2025-06-05
Payer: MEDICARE

## 2025-06-05 LAB
BASOPHILS # BLD: 0.05 K/UL (ref 0–0.2)
BASOPHILS NFR BLD: 1 % (ref 0–2)
EOSINOPHIL # BLD: 0.15 K/UL (ref 0–0.44)
EOSINOPHILS RELATIVE PERCENT: 2 % (ref 1–4)
ERYTHROCYTE [DISTWIDTH] IN BLOOD BY AUTOMATED COUNT: 18 % (ref 11.8–14.4)
HCT VFR BLD AUTO: 37.8 % (ref 36.3–47.1)
HGB BLD-MCNC: 11.2 G/DL (ref 11.9–15.1)
IMM GRANULOCYTES # BLD AUTO: 0.03 K/UL (ref 0–0.3)
IMM GRANULOCYTES NFR BLD: 0 %
LYMPHOCYTES NFR BLD: 1.66 K/UL (ref 1.1–3.7)
LYMPHOCYTES RELATIVE PERCENT: 22 % (ref 24–43)
MCH RBC QN AUTO: 25.7 PG (ref 25.2–33.5)
MCHC RBC AUTO-ENTMCNC: 29.6 G/DL (ref 28.4–34.8)
MCV RBC AUTO: 86.7 FL (ref 82.6–102.9)
MONOCYTES NFR BLD: 0.63 K/UL (ref 0.1–1.2)
MONOCYTES NFR BLD: 8 % (ref 3–12)
NEUTROPHILS NFR BLD: 67 % (ref 36–65)
NEUTS SEG NFR BLD: 5.13 K/UL (ref 1.5–8.1)
NRBC BLD-RTO: 0 PER 100 WBC
PLATELET # BLD AUTO: 283 K/UL (ref 138–453)
PMV BLD AUTO: 11.5 FL (ref 8.1–13.5)
RBC # BLD AUTO: 4.36 M/UL (ref 3.95–5.11)
RBC # BLD: ABNORMAL 10*6/UL
WBC OTHER # BLD: 7.7 K/UL (ref 3.5–11.3)

## 2025-06-05 PROCEDURE — 85025 COMPLETE CBC W/AUTO DIFF WBC: CPT
